# Patient Record
Sex: FEMALE | Race: WHITE | NOT HISPANIC OR LATINO | Employment: OTHER | URBAN - NONMETROPOLITAN AREA
[De-identification: names, ages, dates, MRNs, and addresses within clinical notes are randomized per-mention and may not be internally consistent; named-entity substitution may affect disease eponyms.]

---

## 2017-02-07 ENCOUNTER — DOCTOR'S OFFICE (OUTPATIENT)
Dept: URBAN - NONMETROPOLITAN AREA CLINIC 1 | Facility: CLINIC | Age: 62
Setting detail: OPHTHALMOLOGY
End: 2017-02-07
Payer: COMMERCIAL

## 2017-02-07 DIAGNOSIS — H40.003: ICD-10-CM

## 2017-02-07 DIAGNOSIS — H35.3111: ICD-10-CM

## 2017-02-07 DIAGNOSIS — H35.371: ICD-10-CM

## 2017-02-07 DIAGNOSIS — H01.004: ICD-10-CM

## 2017-02-07 DIAGNOSIS — D31.32: ICD-10-CM

## 2017-02-07 DIAGNOSIS — H35.3122: ICD-10-CM

## 2017-02-07 DIAGNOSIS — H04.121: ICD-10-CM

## 2017-02-07 DIAGNOSIS — H02.423: ICD-10-CM

## 2017-02-07 DIAGNOSIS — H01.001: ICD-10-CM

## 2017-02-07 DIAGNOSIS — H35.3131: ICD-10-CM

## 2017-02-07 DIAGNOSIS — H04.123: ICD-10-CM

## 2017-02-07 DIAGNOSIS — H25.13: ICD-10-CM

## 2017-02-07 PROBLEM — H02.011 TRICHIASIS; RIGHT UPPER LID: Status: RESOLVED | Noted: 2017-02-07 | Resolved: 2017-02-07

## 2017-02-07 PROCEDURE — 83861 MICROFLUID ANALY TEARS: CPT | Performed by: OPHTHALMOLOGY

## 2017-02-07 PROCEDURE — 92014 COMPRE OPH EXAM EST PT 1/>: CPT | Performed by: OPHTHALMOLOGY

## 2017-02-07 PROCEDURE — 76514 ECHO EXAM OF EYE THICKNESS: CPT | Performed by: OPHTHALMOLOGY

## 2017-02-07 PROCEDURE — 92133 CPTRZD OPH DX IMG PST SGM ON: CPT | Performed by: OPHTHALMOLOGY

## 2017-02-07 ASSESSMENT — REFRACTION_AUTOREFRACTION
OD_AXIS: 97
OS_AXIS: 168
OD_SPHERE: -1.00
OD_CYLINDER: -1.00
OS_SPHERE: -1.75
OS_CYLINDER: -1.00

## 2017-02-07 ASSESSMENT — LID POSITION - PTOSIS
OS_PTOSIS: 1+
OD_PTOSIS: 2+

## 2017-02-07 ASSESSMENT — SPHEQUIV_DERIVED
OD_SPHEQUIV: -1.5
OS_SPHEQUIV: -2.25
OD_SPHEQUIV: -1.25
OS_SPHEQUIV: -2.25

## 2017-02-07 ASSESSMENT — LID EXAM ASSESSMENTS
OS_MEIBOMITIS: 1+
OS_BLEPHARITIS: T
OD_MEIBOMITIS: 1+
OD_BLEPHARITIS: T

## 2017-02-07 ASSESSMENT — LID POSITION - COMMENTS
OS_COMMENTS: BILATERAL TARSORRAPHY, MRD 1= .5/2
OD_COMMENTS: BILATERAL TARSORRAPHY, MRD 1= .5/2

## 2017-02-07 ASSESSMENT — REFRACTION_MANIFEST
OS_AXIS: 162
OS_VA3: 20/
OS_VA1: 20/
OS_VA1: 20/40
OS_VA2: 20/40
OS_ADD: +2.50
OD_AXIS: 040
OU_VA: 20/
OS_SPHERE: -1.75
OS_CYLINDER: -1.00
OD_ADD: +2.50
OD_VA1: 20/
OD_VA1: 20/
OD_CYLINDER: -0.50
OS_VA1: 20/
OD_VA3: 20/
OS_VA3: 20/
OU_VA: 20/
OD_VA2: 20/
OD_VA1: 20/40
OD_VA3: 20/
OD_VA3: 20/
OS_VA2: 20/
OS_VA3: 20/
OD_VA2: 20/
OU_VA: 20/
OD_SPHERE: -1.00
OD_VA2: 20/40
OS_VA2: 20/

## 2017-02-07 ASSESSMENT — REFRACTION_CURRENTRX
OS_ADD: +2.50
OD_OVR_VA: 20/
OD_AXIS: 041
OD_AXIS: 070
OD_ADD: +2.50
OD_CYLINDER: -0.50
OS_CYLINDER: -1.00
OD_ADD: +2.25
OD_VPRISM_DIRECTION: PROGS
OS_SPHERE: -1.25
OD_SPHERE: -1.25
OD_OVR_VA: 20/
OD_SPHERE: -0.75
OS_VPRISM_DIRECTION: PROGS
OD_OVR_VA: 20/
OS_AXIS: 160
OS_SPHERE: -1.75
OS_OVR_VA: 20/
OS_OVR_VA: 20/
OS_CYLINDER: -0.50
OS_ADD: +2.25
OS_OVR_VA: 20/
OS_AXIS: 102
OD_CYLINDER: -0.50

## 2017-02-07 ASSESSMENT — PACHYMETRY
OS_CT_UM: 592
OD_CT_UM: 585
OS_CT_CORRECTION: -4
OD_CT_CORRECTION: -3

## 2017-02-07 ASSESSMENT — VISUAL ACUITY
OD_BCVA: 20/25
OS_BCVA: 20/20-1

## 2017-02-07 ASSESSMENT — SUPERFICIAL PUNCTATE KERATITIS (SPK)
OS_SPK: 1+ 2+
OD_SPK: 1+ 2+

## 2017-02-07 ASSESSMENT — CONFRONTATIONAL VISUAL FIELD TEST (CVF)
OD_FINDINGS: FULL
OS_FINDINGS: FULL

## 2017-04-17 ENCOUNTER — OPTICAL OFFICE (OUTPATIENT)
Dept: URBAN - NONMETROPOLITAN AREA CLINIC 4 | Facility: CLINIC | Age: 62
Setting detail: OPHTHALMOLOGY
End: 2017-04-17
Payer: COMMERCIAL

## 2017-04-17 ENCOUNTER — DOCTOR'S OFFICE (OUTPATIENT)
Dept: URBAN - NONMETROPOLITAN AREA CLINIC 1 | Facility: CLINIC | Age: 62
Setting detail: OPHTHALMOLOGY
End: 2017-04-17
Payer: COMMERCIAL

## 2017-04-17 DIAGNOSIS — H52.4: ICD-10-CM

## 2017-04-17 DIAGNOSIS — H52.223: ICD-10-CM

## 2017-04-17 PROCEDURE — V2025 EYEGLASSES DELUX FRAMES: HCPCS | Performed by: OPTOMETRIST

## 2017-04-17 PROCEDURE — V2750 ANTI-REFLECTIVE COATING: HCPCS | Performed by: OPTOMETRIST

## 2017-04-17 PROCEDURE — V2781 PROGRESSIVE LENS PER LENS: HCPCS | Performed by: OPTOMETRIST

## 2017-04-17 PROCEDURE — V2020 VISION SVCS FRAMES PURCHASES: HCPCS | Performed by: OPTOMETRIST

## 2017-04-17 PROCEDURE — 92015 DETERMINE REFRACTIVE STATE: CPT | Performed by: OPTOMETRIST

## 2017-04-17 PROCEDURE — V2203 LENS SPHCYL BIFOCAL 4.00D/.1: HCPCS | Performed by: OPTOMETRIST

## 2017-04-17 PROCEDURE — V2744 TINT PHOTOCHROMATIC LENS/ES: HCPCS | Performed by: OPTOMETRIST

## 2017-04-17 ASSESSMENT — REFRACTION_MANIFEST
OS_VA3: 20/
OU_VA: 20/
OD_VA1: 20/
OS_VA1: 20/
OD_VA1: 20/
OD_VA2: 20/
OD_VA2: 20/
OD_VA3: 20/
OU_VA: 20/
OS_VA3: 20/
OS_VA2: 20/
OD_VA3: 20/
OS_VA1: 20/
OS_VA2: 20/

## 2017-04-17 ASSESSMENT — REFRACTION_OUTSIDERX
OD_CYLINDER: -0.75
OS_VA2: 20/20-2
OS_SPHERE: -1.50
OD_AXIS: 030
OS_AXIS: 163
OS_VA3: 20/
OS_CYLINDER: -0.25
OS_VA1: 20/20-2
OD_VA3: 20/
OD_SPHERE: -1.00
OS_ADD: +2.50
OD_VA2: 20/20-2
OU_VA: 20/
OD_ADD: +2.50
OD_VA1: 20/20-2

## 2017-04-17 ASSESSMENT — REFRACTION_CURRENTRX
OD_CYLINDER: -0.50
OD_ADD: +2.50
OD_OVR_VA: 20/
OS_SPHERE: -1.75
OD_SPHERE: -1.00
OS_VPRISM_DIRECTION: PROGS
OS_OVR_VA: 20/
OD_AXIS: 070
OS_SPHERE: -1.25
OS_CYLINDER: -0.75
OD_AXIS: 50
OS_ADD: +2.50
OS_OVR_VA: 20/
OD_OVR_VA: 20/
OD_SPHERE: -0.75
OS_AXIS: 98
OD_CYLINDER: -0.50
OS_OVR_VA: 20/
OD_OVR_VA: 20/
OS_VPRISM_DIRECTION: PROGS
OS_CYLINDER: -0.50
OD_VPRISM_DIRECTION: PROGS
OD_VPRISM_DIRECTION: PROGS
OD_ADD: +2.50
OS_AXIS: 158
OS_ADD: +2.50

## 2017-04-17 ASSESSMENT — VISUAL ACUITY
OD_BCVA: 20/20
OS_BCVA: 20/20+1

## 2017-04-17 ASSESSMENT — REFRACTION_AUTOREFRACTION
OS_SPHERE: -1.50
OD_AXIS: 7
OS_AXIS: 163
OS_CYLINDER: -0.25
OD_SPHERE: -1.00
OD_CYLINDER: -1.00

## 2017-04-17 ASSESSMENT — SPHEQUIV_DERIVED
OS_SPHEQUIV: -1.625
OD_SPHEQUIV: -1.5

## 2017-05-10 ENCOUNTER — DOCTOR'S OFFICE (OUTPATIENT)
Dept: URBAN - NONMETROPOLITAN AREA CLINIC 1 | Facility: CLINIC | Age: 62
Setting detail: OPHTHALMOLOGY
End: 2017-05-10
Payer: COMMERCIAL

## 2017-05-10 DIAGNOSIS — H35.3111: ICD-10-CM

## 2017-05-10 DIAGNOSIS — H35.3122: ICD-10-CM

## 2017-05-10 DIAGNOSIS — H40.003: ICD-10-CM

## 2017-05-10 DIAGNOSIS — D31.32: ICD-10-CM

## 2017-05-10 DIAGNOSIS — H04.122: ICD-10-CM

## 2017-05-10 DIAGNOSIS — H04.121: ICD-10-CM

## 2017-05-10 DIAGNOSIS — H35.371: ICD-10-CM

## 2017-05-10 DIAGNOSIS — H25.13: ICD-10-CM

## 2017-05-10 PROCEDURE — 92134 CPTRZ OPH DX IMG PST SGM RTA: CPT | Performed by: OPHTHALMOLOGY

## 2017-05-10 PROCEDURE — 92014 COMPRE OPH EXAM EST PT 1/>: CPT | Performed by: OPHTHALMOLOGY

## 2017-05-10 ASSESSMENT — REFRACTION_AUTOREFRACTION
OD_CYLINDER: -1.00
OS_CYLINDER: -0.25
OD_SPHERE: -1.00
OS_SPHERE: -1.50
OD_AXIS: 7
OS_AXIS: 163

## 2017-05-10 ASSESSMENT — REFRACTION_MANIFEST
OD_VA3: 20/
OD_VA2: 20/
OS_VA3: 20/
OD_VA3: 20/
OU_VA: 20/
OS_VA1: 20/
OS_VA1: 20/
OU_VA: 20/
OD_VA1: 20/
OS_VA3: 20/
OS_VA2: 20/
OD_VA1: 20/
OD_VA2: 20/
OS_VA2: 20/

## 2017-05-10 ASSESSMENT — SPHEQUIV_DERIVED
OD_SPHEQUIV: -1.5
OS_SPHEQUIV: -1.625

## 2017-05-10 ASSESSMENT — REFRACTION_CURRENTRX
OD_VPRISM_DIRECTION: PROGS
OD_ADD: +2.50
OD_AXIS: 50
OD_VPRISM_DIRECTION: PROGS
OS_AXIS: 98
OD_ADD: +2.50
OD_AXIS: 070
OS_SPHERE: -1.25
OS_SPHERE: -1.75
OD_SPHERE: -0.75
OS_OVR_VA: 20/
OD_CYLINDER: -0.50
OS_ADD: +2.50
OS_OVR_VA: 20/
OD_SPHERE: -1.00
OS_CYLINDER: -0.50
OS_VPRISM_DIRECTION: PROGS
OD_OVR_VA: 20/
OD_CYLINDER: -0.50
OS_AXIS: 158
OS_ADD: +2.50
OS_OVR_VA: 20/
OD_OVR_VA: 20/
OS_CYLINDER: -0.75
OD_OVR_VA: 20/
OS_VPRISM_DIRECTION: PROGS

## 2017-05-10 ASSESSMENT — VISUAL ACUITY
OS_BCVA: 20/25
OD_BCVA: 20/25

## 2017-05-10 ASSESSMENT — REFRACTION_OUTSIDERX
OD_VA1: 20/20-2
OD_CYLINDER: -0.75
OD_ADD: +2.50
OS_VA1: 20/20-2
OS_ADD: +2.50
OS_SPHERE: -1.50
OS_AXIS: 163
OS_CYLINDER: -0.25
OU_VA: 20/
OS_VA2: 20/20-2
OS_VA3: 20/
OD_AXIS: 030
OD_VA3: 20/
OD_SPHERE: -1.00
OD_VA2: 20/20-2

## 2017-05-10 ASSESSMENT — SUPERFICIAL PUNCTATE KERATITIS (SPK)
OD_SPK: 1+ 2+
OS_SPK: 1+ 2+

## 2017-05-10 ASSESSMENT — LID POSITION - COMMENTS
OS_COMMENTS: BILATERAL TARSORRAPHY, MRD 1= .5/2
OD_COMMENTS: BILATERAL TARSORRAPHY, MRD 1= .5/2

## 2017-05-10 ASSESSMENT — LID EXAM ASSESSMENTS
OD_BLEPHARITIS: T
OS_BLEPHARITIS: T
OD_MEIBOMITIS: 1+
OS_MEIBOMITIS: 1+

## 2017-05-10 ASSESSMENT — CONFRONTATIONAL VISUAL FIELD TEST (CVF)
OD_FINDINGS: FULL
OS_FINDINGS: FULL

## 2017-05-10 ASSESSMENT — LID POSITION - PTOSIS
OS_PTOSIS: 1+
OD_PTOSIS: 2+

## 2017-06-12 ENCOUNTER — APPOINTMENT (OUTPATIENT)
Dept: PHYSICAL THERAPY | Facility: CLINIC | Age: 62
End: 2017-06-12
Payer: COMMERCIAL

## 2017-06-12 PROCEDURE — 97140 MANUAL THERAPY 1/> REGIONS: CPT

## 2017-06-12 PROCEDURE — 97110 THERAPEUTIC EXERCISES: CPT

## 2017-06-12 PROCEDURE — 97163 PT EVAL HIGH COMPLEX 45 MIN: CPT

## 2017-06-13 ENCOUNTER — APPOINTMENT (OUTPATIENT)
Dept: PHYSICAL THERAPY | Facility: CLINIC | Age: 62
End: 2017-06-13
Payer: COMMERCIAL

## 2017-06-13 PROCEDURE — 97110 THERAPEUTIC EXERCISES: CPT

## 2017-06-13 PROCEDURE — 97140 MANUAL THERAPY 1/> REGIONS: CPT

## 2017-06-15 ENCOUNTER — APPOINTMENT (OUTPATIENT)
Dept: PHYSICAL THERAPY | Facility: CLINIC | Age: 62
End: 2017-06-15
Payer: COMMERCIAL

## 2017-06-15 PROCEDURE — 97140 MANUAL THERAPY 1/> REGIONS: CPT

## 2017-06-15 PROCEDURE — 97110 THERAPEUTIC EXERCISES: CPT

## 2017-06-19 ENCOUNTER — APPOINTMENT (OUTPATIENT)
Dept: PHYSICAL THERAPY | Facility: CLINIC | Age: 62
End: 2017-06-19
Payer: COMMERCIAL

## 2017-06-19 PROCEDURE — 97110 THERAPEUTIC EXERCISES: CPT

## 2017-06-19 PROCEDURE — 97140 MANUAL THERAPY 1/> REGIONS: CPT

## 2017-06-20 ENCOUNTER — APPOINTMENT (OUTPATIENT)
Dept: PHYSICAL THERAPY | Facility: CLINIC | Age: 62
End: 2017-06-20
Payer: COMMERCIAL

## 2017-06-20 PROCEDURE — 97140 MANUAL THERAPY 1/> REGIONS: CPT

## 2017-06-20 PROCEDURE — 97110 THERAPEUTIC EXERCISES: CPT

## 2017-06-22 ENCOUNTER — APPOINTMENT (OUTPATIENT)
Dept: PHYSICAL THERAPY | Facility: CLINIC | Age: 62
End: 2017-06-22
Payer: COMMERCIAL

## 2017-06-22 PROCEDURE — 97110 THERAPEUTIC EXERCISES: CPT

## 2017-06-22 PROCEDURE — 97140 MANUAL THERAPY 1/> REGIONS: CPT

## 2017-06-26 ENCOUNTER — APPOINTMENT (OUTPATIENT)
Dept: PHYSICAL THERAPY | Facility: CLINIC | Age: 62
End: 2017-06-26
Payer: COMMERCIAL

## 2017-06-26 PROCEDURE — 97140 MANUAL THERAPY 1/> REGIONS: CPT

## 2017-06-26 PROCEDURE — 97110 THERAPEUTIC EXERCISES: CPT

## 2017-06-27 ENCOUNTER — APPOINTMENT (OUTPATIENT)
Dept: PHYSICAL THERAPY | Facility: CLINIC | Age: 62
End: 2017-06-27
Payer: COMMERCIAL

## 2017-06-27 PROCEDURE — 97140 MANUAL THERAPY 1/> REGIONS: CPT

## 2017-06-27 PROCEDURE — 97110 THERAPEUTIC EXERCISES: CPT

## 2017-06-29 ENCOUNTER — APPOINTMENT (OUTPATIENT)
Dept: PHYSICAL THERAPY | Facility: CLINIC | Age: 62
End: 2017-06-29
Payer: COMMERCIAL

## 2017-06-29 PROCEDURE — 97140 MANUAL THERAPY 1/> REGIONS: CPT

## 2017-06-29 PROCEDURE — 97110 THERAPEUTIC EXERCISES: CPT

## 2017-07-03 ENCOUNTER — APPOINTMENT (OUTPATIENT)
Dept: PHYSICAL THERAPY | Facility: CLINIC | Age: 62
End: 2017-07-03
Payer: COMMERCIAL

## 2017-07-03 PROCEDURE — 97140 MANUAL THERAPY 1/> REGIONS: CPT

## 2017-07-03 PROCEDURE — 97110 THERAPEUTIC EXERCISES: CPT

## 2018-02-13 ENCOUNTER — DOCTOR'S OFFICE (OUTPATIENT)
Dept: URBAN - NONMETROPOLITAN AREA CLINIC 1 | Facility: CLINIC | Age: 63
Setting detail: OPHTHALMOLOGY
End: 2018-02-13
Payer: COMMERCIAL

## 2018-02-13 ENCOUNTER — RX ONLY (RX ONLY)
Age: 63
End: 2018-02-13

## 2018-02-13 DIAGNOSIS — H01.004: ICD-10-CM

## 2018-02-13 DIAGNOSIS — H01.001: ICD-10-CM

## 2018-02-13 DIAGNOSIS — H04.123: ICD-10-CM

## 2018-02-13 DIAGNOSIS — H17.9: ICD-10-CM

## 2018-02-13 DIAGNOSIS — H10.12: ICD-10-CM

## 2018-02-13 PROCEDURE — 92014 COMPRE OPH EXAM EST PT 1/>: CPT | Performed by: OPHTHALMOLOGY

## 2018-02-13 ASSESSMENT — LID POSITION - PTOSIS
OD_PTOSIS: 2+
OS_PTOSIS: 1+

## 2018-02-13 ASSESSMENT — REFRACTION_OUTSIDERX
OU_VA: 20/
OS_VA2: 20/20-2
OS_ADD: +2.50
OD_VA1: 20/20-2
OS_SPHERE: -1.50
OD_SPHERE: -1.00
OD_CYLINDER: -0.75
OD_VA3: 20/
OS_AXIS: 163
OS_VA3: 20/
OS_VA1: 20/20-2
OD_ADD: +2.50
OS_CYLINDER: -0.25
OD_VA2: 20/20-2
OD_AXIS: 030

## 2018-02-13 ASSESSMENT — REFRACTION_CURRENTRX
OD_AXIS: 50
OS_SPHERE: -1.25
OD_CYLINDER: -0.50
OS_CYLINDER: -0.75
OD_ADD: +2.50
OS_VPRISM_DIRECTION: PROGS
OD_VPRISM_DIRECTION: PROGS
OS_CYLINDER: -0.50
OS_VPRISM_DIRECTION: PROGS
OD_OVR_VA: 20/
OS_ADD: +2.50
OD_SPHERE: -0.75
OS_AXIS: 158
OD_VPRISM_DIRECTION: PROGS
OD_OVR_VA: 20/
OD_SPHERE: -1.00
OS_OVR_VA: 20/
OD_AXIS: 070
OS_SPHERE: -1.75
OS_OVR_VA: 20/
OD_OVR_VA: 20/
OS_AXIS: 98
OD_CYLINDER: -0.50
OS_ADD: +2.50
OD_ADD: +2.50
OS_OVR_VA: 20/

## 2018-02-13 ASSESSMENT — VISUAL ACUITY
OD_BCVA: 20/30+2
OS_BCVA: 20/30+1

## 2018-02-13 ASSESSMENT — REFRACTION_AUTOREFRACTION
OS_CYLINDER: -0.25
OD_AXIS: 7
OD_SPHERE: -1.00
OS_SPHERE: -1.50
OS_AXIS: 163
OD_CYLINDER: -1.00

## 2018-02-13 ASSESSMENT — REFRACTION_MANIFEST
OD_VA2: 20/
OS_VA3: 20/
OS_VA3: 20/
OD_VA1: 20/
OS_VA2: 20/
OS_VA1: 20/
OS_VA2: 20/
OS_VA1: 20/
OD_VA1: 20/
OD_VA3: 20/
OD_VA2: 20/
OU_VA: 20/
OU_VA: 20/
OD_VA3: 20/

## 2018-02-13 ASSESSMENT — LID EXAM ASSESSMENTS
OS_MEIBOMITIS: 1+
OD_MEIBOMITIS: 1+
OS_BLEPHARITIS: T
OD_BLEPHARITIS: T

## 2018-02-13 ASSESSMENT — SPHEQUIV_DERIVED
OS_SPHEQUIV: -1.625
OD_SPHEQUIV: -1.5

## 2018-02-13 ASSESSMENT — CONFRONTATIONAL VISUAL FIELD TEST (CVF)
OD_FINDINGS: FULL
OS_FINDINGS: FULL

## 2018-02-13 ASSESSMENT — SUPERFICIAL PUNCTATE KERATITIS (SPK)
OD_SPK: 1+ 2+
OS_SPK: 1+ 2+

## 2018-02-13 ASSESSMENT — LID POSITION - COMMENTS
OS_COMMENTS: BILATERAL TARSORRAPHY, MRD 1= .5/2
OD_COMMENTS: BILATERAL TARSORRAPHY, MRD 1= .5/2

## 2018-07-31 ENCOUNTER — DOCTOR'S OFFICE (OUTPATIENT)
Dept: URBAN - NONMETROPOLITAN AREA CLINIC 1 | Facility: CLINIC | Age: 63
Setting detail: OPHTHALMOLOGY
End: 2018-07-31
Payer: COMMERCIAL

## 2018-07-31 DIAGNOSIS — H04.122: ICD-10-CM

## 2018-07-31 DIAGNOSIS — H35.3111: ICD-10-CM

## 2018-07-31 DIAGNOSIS — H35.371: ICD-10-CM

## 2018-07-31 DIAGNOSIS — H04.123: ICD-10-CM

## 2018-07-31 DIAGNOSIS — H01.004: ICD-10-CM

## 2018-07-31 DIAGNOSIS — H25.13: ICD-10-CM

## 2018-07-31 DIAGNOSIS — H10.12: ICD-10-CM

## 2018-07-31 DIAGNOSIS — H01.001: ICD-10-CM

## 2018-07-31 DIAGNOSIS — H35.3122: ICD-10-CM

## 2018-07-31 DIAGNOSIS — H17.9: ICD-10-CM

## 2018-07-31 DIAGNOSIS — H40.013: ICD-10-CM

## 2018-07-31 PROCEDURE — 76514 ECHO EXAM OF EYE THICKNESS: CPT | Performed by: OPHTHALMOLOGY

## 2018-07-31 PROCEDURE — 92134 CPTRZ OPH DX IMG PST SGM RTA: CPT | Performed by: OPHTHALMOLOGY

## 2018-07-31 PROCEDURE — 92014 COMPRE OPH EXAM EST PT 1/>: CPT | Performed by: OPHTHALMOLOGY

## 2018-07-31 PROCEDURE — 83861 MICROFLUID ANALY TEARS: CPT | Performed by: OPHTHALMOLOGY

## 2018-07-31 PROCEDURE — 92083 EXTENDED VISUAL FIELD XM: CPT | Performed by: OPHTHALMOLOGY

## 2018-07-31 ASSESSMENT — REFRACTION_CURRENTRX
OD_ADD: +2.50
OS_AXIS: 98
OD_AXIS: 50
OD_SPHERE: -0.75
OD_ADD: +2.50
OS_ADD: +2.50
OS_AXIS: 158
OD_OVR_VA: 20/
OD_OVR_VA: 20/
OS_CYLINDER: -0.75
OD_OVR_VA: 20/
OD_SPHERE: -1.00
OS_SPHERE: -1.75
OS_ADD: +2.50
OS_OVR_VA: 20/
OS_VPRISM_DIRECTION: PROGS
OD_VPRISM_DIRECTION: PROGS
OD_CYLINDER: -0.50
OS_VPRISM_DIRECTION: PROGS
OD_AXIS: 070
OS_SPHERE: -1.25
OS_OVR_VA: 20/
OS_OVR_VA: 20/
OD_VPRISM_DIRECTION: PROGS
OD_CYLINDER: -0.50
OS_CYLINDER: -0.50

## 2018-07-31 ASSESSMENT — SPHEQUIV_DERIVED
OS_SPHEQUIV: -1.625
OD_SPHEQUIV: -1.5

## 2018-07-31 ASSESSMENT — REFRACTION_OUTSIDERX
OD_SPHERE: -1.00
OD_ADD: +2.50
OS_VA2: 20/20-2
OS_CYLINDER: -0.25
OS_ADD: +2.50
OU_VA: 20/
OD_VA3: 20/
OS_VA1: 20/20-2
OD_VA1: 20/20-2
OD_CYLINDER: -0.75
OD_AXIS: 030
OS_VA3: 20/
OS_SPHERE: -1.50
OD_VA2: 20/20-2
OS_AXIS: 163

## 2018-07-31 ASSESSMENT — REFRACTION_MANIFEST
OS_VA1: 20/
OD_VA2: 20/
OD_VA1: 20/
OS_VA1: 20/
OU_VA: 20/
OS_VA2: 20/
OD_VA3: 20/
OS_VA3: 20/
OD_VA3: 20/
OU_VA: 20/
OD_VA2: 20/
OS_VA3: 20/
OS_VA2: 20/
OD_VA1: 20/

## 2018-07-31 ASSESSMENT — PACHYMETRY
OD_CT_UM: 621
OS_CT_UM: 631
OD_CT_CORRECTION: -6
OS_CT_CORRECTION: -6

## 2018-07-31 ASSESSMENT — LID EXAM ASSESSMENTS
OS_BLEPHARITIS: T
OD_MEIBOMITIS: 1+
OS_MEIBOMITIS: 1+
OD_BLEPHARITIS: T

## 2018-07-31 ASSESSMENT — LID POSITION - COMMENTS
OS_COMMENTS: BILATERAL TARSORRAPHY, MRD 1= .5/2
OD_COMMENTS: BILATERAL TARSORRAPHY, MRD 1= .5/2

## 2018-07-31 ASSESSMENT — CONFRONTATIONAL VISUAL FIELD TEST (CVF)
OD_FINDINGS: FULL
OS_FINDINGS: FULL

## 2018-07-31 ASSESSMENT — REFRACTION_AUTOREFRACTION
OS_SPHERE: -1.50
OD_AXIS: 7
OD_SPHERE: -1.00
OS_AXIS: 163
OS_CYLINDER: -0.25
OD_CYLINDER: -1.00

## 2018-07-31 ASSESSMENT — VISUAL ACUITY
OS_BCVA: 20/25-2
OD_BCVA: 20/25-1

## 2018-07-31 ASSESSMENT — SUPERFICIAL PUNCTATE KERATITIS (SPK)
OS_SPK: 1+ 2+
OD_SPK: 1+ 2+

## 2018-07-31 ASSESSMENT — LID POSITION - PTOSIS
OS_PTOSIS: 1+
OD_PTOSIS: 2+

## 2019-02-08 ENCOUNTER — DOCTOR'S OFFICE (OUTPATIENT)
Dept: URBAN - NONMETROPOLITAN AREA CLINIC 1 | Facility: CLINIC | Age: 64
Setting detail: OPHTHALMOLOGY
End: 2019-02-08
Payer: COMMERCIAL

## 2019-02-08 DIAGNOSIS — H40.013: ICD-10-CM

## 2019-02-08 DIAGNOSIS — H25.13: ICD-10-CM

## 2019-02-08 DIAGNOSIS — H04.122: ICD-10-CM

## 2019-02-08 DIAGNOSIS — H17.9: ICD-10-CM

## 2019-02-08 DIAGNOSIS — H04.121: ICD-10-CM

## 2019-02-08 DIAGNOSIS — H04.123: ICD-10-CM

## 2019-02-08 PROBLEM — H10.12 ALLERGIC CONJUNCTIVITIS; LEFT EYE: Status: RESOLVED | Noted: 2018-02-13 | Resolved: 2019-02-08

## 2019-02-08 PROCEDURE — 83861 MICROFLUID ANALY TEARS: CPT | Performed by: OPHTHALMOLOGY

## 2019-02-08 PROCEDURE — 92014 COMPRE OPH EXAM EST PT 1/>: CPT | Performed by: OPHTHALMOLOGY

## 2019-02-08 PROCEDURE — 92133 CPTRZD OPH DX IMG PST SGM ON: CPT | Performed by: OPHTHALMOLOGY

## 2019-02-08 ASSESSMENT — SPHEQUIV_DERIVED
OD_SPHEQUIV: -1.375
OS_SPHEQUIV: -1.625
OS_SPHEQUIV: -1.625
OD_SPHEQUIV: -1.5

## 2019-02-08 ASSESSMENT — REFRACTION_CURRENTRX
OD_OVR_VA: 20/
OS_ADD: +2.50
OS_VPRISM_DIRECTION: PROGS
OS_AXIS: 158
OS_CYLINDER: -0.75
OS_OVR_VA: 20/
OS_SPHERE: -1.25
OD_AXIS: 070
OD_CYLINDER: -0.50
OS_OVR_VA: 20/
OD_OVR_VA: 20/
OS_SPHERE: -1.75
OD_ADD: +2.50
OD_OVR_VA: 20/
OS_CYLINDER: -0.50
OS_ADD: +2.50
OS_VPRISM_DIRECTION: PROGS
OD_AXIS: 50
OD_VPRISM_DIRECTION: PROGS
OD_VPRISM_DIRECTION: PROGS
OD_SPHERE: -0.75
OD_SPHERE: -1.00
OS_OVR_VA: 20/
OD_ADD: +2.50
OD_CYLINDER: -0.50
OS_AXIS: 98

## 2019-02-08 ASSESSMENT — REFRACTION_MANIFEST
OS_AXIS: 163
OU_VA: 20/
OD_VA1: 20/20-2
OS_VA3: 20/
OS_VA1: 20/
OD_AXIS: 030
OS_VA2: 20/20-2
OD_VA1: 20/
OS_ADD: +2.50
OD_VA3: 20/
OD_VA2: 20/20-2
OD_SPHERE: -1.00
OD_VA2: 20/
OS_VA2: 20/
OS_CYLINDER: -0.25
OS_SPHERE: -1.50
OD_VA3: 20/
OS_VA1: 20/20-2
OD_ADD: +2.50
OS_VA3: 20/
OU_VA: 20/
OD_CYLINDER: -0.75

## 2019-02-08 ASSESSMENT — VISUAL ACUITY
OS_BCVA: 20/25-1
OD_BCVA: 20/25-1

## 2019-02-08 ASSESSMENT — SUPERFICIAL PUNCTATE KERATITIS (SPK)
OS_SPK: 1+ 2+
OD_SPK: 1+ 2+

## 2019-02-08 ASSESSMENT — LID POSITION - COMMENTS
OD_COMMENTS: BILATERAL TARSORRAPHY, MRD 1= .5/2
OS_COMMENTS: BILATERAL TARSORRAPHY, MRD 1= .5/2

## 2019-02-08 ASSESSMENT — LID EXAM ASSESSMENTS
OS_BLEPHARITIS: T
OD_MEIBOMITIS: 1+
OS_MEIBOMITIS: 1+
OD_BLEPHARITIS: T

## 2019-02-08 ASSESSMENT — CONFRONTATIONAL VISUAL FIELD TEST (CVF)
OS_FINDINGS: FULL
OD_FINDINGS: FULL

## 2019-02-08 ASSESSMENT — LID POSITION - PTOSIS
OS_PTOSIS: 1+
OD_PTOSIS: 2+

## 2019-02-08 ASSESSMENT — REFRACTION_AUTOREFRACTION
OS_AXIS: 163
OD_AXIS: 7
OS_SPHERE: -1.50
OD_SPHERE: -1.00
OD_CYLINDER: -1.00
OS_CYLINDER: -0.25

## 2019-08-13 ENCOUNTER — DOCTOR'S OFFICE (OUTPATIENT)
Dept: URBAN - NONMETROPOLITAN AREA CLINIC 1 | Facility: CLINIC | Age: 64
Setting detail: OPHTHALMOLOGY
End: 2019-08-13
Payer: COMMERCIAL

## 2019-08-13 DIAGNOSIS — H35.371: ICD-10-CM

## 2019-08-13 DIAGNOSIS — H40.013: ICD-10-CM

## 2019-08-13 DIAGNOSIS — H04.123: ICD-10-CM

## 2019-08-13 DIAGNOSIS — H04.122: ICD-10-CM

## 2019-08-13 DIAGNOSIS — H25.13: ICD-10-CM

## 2019-08-13 DIAGNOSIS — H04.121: ICD-10-CM

## 2019-08-13 PROCEDURE — 99215 OFFICE O/P EST HI 40 MIN: CPT | Performed by: OPHTHALMOLOGY

## 2019-08-13 PROCEDURE — 83861 MICROFLUID ANALY TEARS: CPT | Performed by: OPHTHALMOLOGY

## 2019-08-13 PROCEDURE — 68761 CLOSE TEAR DUCT OPENING: CPT | Performed by: OPHTHALMOLOGY

## 2019-08-13 PROCEDURE — 92083 EXTENDED VISUAL FIELD XM: CPT | Performed by: OPHTHALMOLOGY

## 2019-08-13 PROCEDURE — 92134 CPTRZ OPH DX IMG PST SGM RTA: CPT | Performed by: OPHTHALMOLOGY

## 2019-08-13 ASSESSMENT — REFRACTION_CURRENTRX
OS_CYLINDER: -0.50
OS_AXIS: 102
OS_ADD: +2.50
OS_AXIS: 158
OD_OVR_VA: 20/
OS_VPRISM_DIRECTION: PROGS
OD_CYLINDER: -0.50
OD_VPRISM_DIRECTION: PROGS
OD_CYLINDER: -0.50
OD_AXIS: 069
OS_OVR_VA: 20/
OD_VPRISM_DIRECTION: PROGS
OS_VPRISM_DIRECTION: PROGS
OD_OVR_VA: 20/
OD_OVR_VA: 20/
OD_SPHERE: -1.00
OS_OVR_VA: 20/
OD_SPHERE: -0.75
OD_ADD: +2.50
OD_AXIS: 50
OS_CYLINDER: -0.75
OD_ADD: +2.50
OS_ADD: +2.50
OS_SPHERE: -1.25
OS_OVR_VA: 20/
OS_SPHERE: -1.75

## 2019-08-13 ASSESSMENT — REFRACTION_MANIFEST
OS_VA2: 20/20-2
OD_ADD: +2.50
OD_SPHERE: -1.00
OD_VA3: 20/
OD_VA1: 20/20-2
OS_VA1: 20/
OS_AXIS: 163
OU_VA: 20/
OS_VA3: 20/
OD_VA1: 20/
OS_VA2: 20/
OS_CYLINDER: -0.25
OD_VA3: 20/
OD_VA2: 20/20-2
OD_VA2: 20/
OU_VA: 20/
OS_SPHERE: -1.50
OS_ADD: +2.50
OD_AXIS: 030
OS_VA1: 20/20-2
OD_CYLINDER: -0.75
OS_VA3: 20/

## 2019-08-13 ASSESSMENT — VISUAL ACUITY
OD_BCVA: 20/25-1
OS_BCVA: 20/25

## 2019-08-13 ASSESSMENT — LID EXAM ASSESSMENTS
OD_BLEPHARITIS: T
OD_MEIBOMITIS: 1+
OS_MEIBOMITIS: 1+
OS_BLEPHARITIS: T

## 2019-08-13 ASSESSMENT — CONFRONTATIONAL VISUAL FIELD TEST (CVF)
OS_FINDINGS: FULL
OD_FINDINGS: FULL

## 2019-08-13 ASSESSMENT — LID POSITION - PTOSIS
OS_PTOSIS: 1+
OD_PTOSIS: 2+

## 2019-08-13 ASSESSMENT — LID POSITION - COMMENTS
OS_COMMENTS: BILATERAL TARSORRAPHY, MRD 1= .5/2
OD_COMMENTS: BILATERAL TARSORRAPHY, MRD 1= .5/2

## 2019-08-13 ASSESSMENT — REFRACTION_AUTOREFRACTION
OD_CYLINDER: -1.00
OS_SPHERE: -0.75
OD_SPHERE: -0.50
OD_AXIS: 008
OS_CYLINDER: 0.00

## 2019-08-13 ASSESSMENT — SPHEQUIV_DERIVED
OS_SPHEQUIV: -1.625
OD_SPHEQUIV: -1
OD_SPHEQUIV: -1.375
OS_SPHEQUIV: -0.75

## 2019-08-13 ASSESSMENT — SUPERFICIAL PUNCTATE KERATITIS (SPK)
OD_SPK: 1+ 2+
OS_SPK: 1+ 2+

## 2019-08-30 ENCOUNTER — DOCTOR'S OFFICE (OUTPATIENT)
Dept: URBAN - METROPOLITAN AREA CLINIC 136 | Facility: CLINIC | Age: 64
Setting detail: OPHTHALMOLOGY
End: 2019-08-30
Payer: COMMERCIAL

## 2019-08-30 DIAGNOSIS — H35.3111: ICD-10-CM

## 2019-08-30 DIAGNOSIS — H35.371: ICD-10-CM

## 2019-08-30 DIAGNOSIS — H35.3122: ICD-10-CM

## 2019-08-30 DIAGNOSIS — H04.123: ICD-10-CM

## 2019-08-30 PROCEDURE — 92014 COMPRE OPH EXAM EST PT 1/>: CPT | Performed by: OPHTHALMOLOGY

## 2019-08-30 PROCEDURE — 92134 CPTRZ OPH DX IMG PST SGM RTA: CPT | Performed by: OPHTHALMOLOGY

## 2019-08-30 ASSESSMENT — REFRACTION_MANIFEST
OD_VA3: 20/
OS_VA1: 20/20-2
OD_ADD: +2.50
OS_VA3: 20/
OS_CYLINDER: -0.25
OD_VA3: 20/
OD_SPHERE: -1.00
OD_VA2: 20/
OD_VA1: 20/
OU_VA: 20/
OS_AXIS: 163
OU_VA: 20/
OS_VA1: 20/
OS_VA3: 20/
OS_SPHERE: -1.50
OD_VA1: 20/20-2
OD_CYLINDER: -0.75
OD_AXIS: 030
OS_ADD: +2.50
OS_VA2: 20/
OD_VA2: 20/20-2
OS_VA2: 20/20-2

## 2019-08-30 ASSESSMENT — LID POSITION - COMMENTS
OD_COMMENTS: BILATERAL TARSORRAPHY, MRD 1= .5/2
OS_COMMENTS: BILATERAL TARSORRAPHY, MRD 1= .5/2

## 2019-08-30 ASSESSMENT — LID EXAM ASSESSMENTS
OS_BLEPHARITIS: T
OS_MEIBOMITIS: 1+
OD_MEIBOMITIS: 1+
OD_BLEPHARITIS: T

## 2019-08-30 ASSESSMENT — REFRACTION_CURRENTRX
OS_ADD: +2.50
OS_AXIS: 158
OD_AXIS: 50
OD_ADD: +2.50
OS_OVR_VA: 20/
OD_ADD: +2.50
OD_SPHERE: -1.00
OD_VPRISM_DIRECTION: PROGS
OS_CYLINDER: -0.50
OD_SPHERE: -0.75
OS_AXIS: 102
OS_OVR_VA: 20/
OD_CYLINDER: -0.50
OS_VPRISM_DIRECTION: PROGS
OS_SPHERE: -1.25
OS_ADD: +2.50
OD_OVR_VA: 20/
OS_SPHERE: -1.75
OD_OVR_VA: 20/
OD_VPRISM_DIRECTION: PROGS
OS_VPRISM_DIRECTION: PROGS
OD_OVR_VA: 20/
OS_OVR_VA: 20/
OD_AXIS: 069
OD_CYLINDER: -0.50
OS_CYLINDER: -0.75

## 2019-08-30 ASSESSMENT — REFRACTION_AUTOREFRACTION
OS_CYLINDER: 0.00
OD_AXIS: 008
OD_SPHERE: -0.50
OD_CYLINDER: -1.00
OS_SPHERE: -0.75

## 2019-08-30 ASSESSMENT — VISUAL ACUITY
OS_BCVA: 20/25-1
OD_BCVA: 20/25-1

## 2019-08-30 ASSESSMENT — SPHEQUIV_DERIVED
OS_SPHEQUIV: -0.75
OD_SPHEQUIV: -1
OD_SPHEQUIV: -1.375
OS_SPHEQUIV: -1.625

## 2019-08-30 ASSESSMENT — SUPERFICIAL PUNCTATE KERATITIS (SPK)
OS_SPK: 1+ 2+
OD_SPK: 1+ 2+

## 2019-08-30 ASSESSMENT — LID POSITION - PTOSIS
OS_PTOSIS: 1+
OD_PTOSIS: 2+

## 2019-08-30 ASSESSMENT — CONFRONTATIONAL VISUAL FIELD TEST (CVF)
OD_FINDINGS: FULL
OS_FINDINGS: FULL

## 2019-09-04 ENCOUNTER — DOCTOR'S OFFICE (OUTPATIENT)
Dept: URBAN - NONMETROPOLITAN AREA CLINIC 1 | Facility: CLINIC | Age: 64
Setting detail: OPHTHALMOLOGY
End: 2019-09-04
Payer: COMMERCIAL

## 2019-09-04 DIAGNOSIS — H04.123: ICD-10-CM

## 2019-09-04 DIAGNOSIS — H02.433: ICD-10-CM

## 2019-09-04 PROCEDURE — 92012 INTRM OPH EXAM EST PATIENT: CPT | Performed by: PHYSICIAN ASSISTANT

## 2019-09-04 ASSESSMENT — LID EXAM ASSESSMENTS
OS_BLEPHARITIS: T
OD_BLEPHARITIS: T
OS_MEIBOMITIS: 1+

## 2019-09-04 ASSESSMENT — DECREASING TEAR LAKE - SEVERITY SCORE: OD_DEC_TEARLAKE: T

## 2019-09-04 ASSESSMENT — SUPERFICIAL PUNCTATE KERATITIS (SPK)
OD_SPK: T
OS_SPK: 1+

## 2019-09-04 ASSESSMENT — LID POSITION - COMMENTS
OS_COMMENTS: BILATERAL TARSORRAPHY, MRD 1= .5/2
OD_COMMENTS: BILATERAL TARSORRAPHY, MRD 1= .5/2

## 2019-09-04 ASSESSMENT — LID POSITION - PTOSIS
OS_PTOSIS: 1+
OD_PTOSIS: 2+

## 2019-09-04 ASSESSMENT — CONFRONTATIONAL VISUAL FIELD TEST (CVF)
OD_FINDINGS: FULL
OS_FINDINGS: FULL

## 2019-09-05 ASSESSMENT — REFRACTION_MANIFEST
OS_VA1: 20/20-2
OS_VA3: 20/
OS_CYLINDER: -0.25
OD_SPHERE: -1.00
OU_VA: 20/
OS_AXIS: 163
OD_VA1: 20/
OD_AXIS: 030
OS_SPHERE: -1.50
OS_ADD: +2.50
OD_VA3: 20/
OS_VA2: 20/
OD_ADD: +2.50
OS_VA2: 20/20-2
OD_VA2: 20/
OD_VA3: 20/
OU_VA: 20/
OD_VA2: 20/20-2
OD_VA1: 20/20-2
OS_VA1: 20/
OS_VA3: 20/
OD_CYLINDER: -0.75

## 2019-09-05 ASSESSMENT — SPHEQUIV_DERIVED
OD_SPHEQUIV: -1.375
OS_SPHEQUIV: -0.75
OS_SPHEQUIV: -1.625
OD_SPHEQUIV: -1

## 2019-09-05 ASSESSMENT — REFRACTION_CURRENTRX
OS_ADD: +2.50
OS_VPRISM_DIRECTION: PROGS
OD_SPHERE: -0.75
OD_ADD: +2.50
OS_AXIS: 102
OS_AXIS: 158
OD_ADD: +2.50
OD_VPRISM_DIRECTION: PROGS
OD_OVR_VA: 20/
OD_CYLINDER: -0.50
OS_VPRISM_DIRECTION: PROGS
OS_SPHERE: -1.75
OS_OVR_VA: 20/
OD_OVR_VA: 20/
OS_CYLINDER: -0.75
OD_OVR_VA: 20/
OS_CYLINDER: -0.50
OS_OVR_VA: 20/
OS_OVR_VA: 20/
OD_AXIS: 069
OD_SPHERE: -1.00
OD_VPRISM_DIRECTION: PROGS
OS_ADD: +2.50
OS_SPHERE: -1.25
OD_AXIS: 50
OD_CYLINDER: -0.50

## 2019-09-05 ASSESSMENT — REFRACTION_AUTOREFRACTION
OS_CYLINDER: 0.00
OD_CYLINDER: -1.00
OD_AXIS: 008
OS_SPHERE: -0.75
OD_SPHERE: -0.50

## 2019-09-05 ASSESSMENT — VISUAL ACUITY
OD_BCVA: 20/25
OS_BCVA: 20/25

## 2019-09-17 ENCOUNTER — DOCTOR'S OFFICE (OUTPATIENT)
Dept: URBAN - NONMETROPOLITAN AREA CLINIC 1 | Facility: CLINIC | Age: 64
Setting detail: OPHTHALMOLOGY
End: 2019-09-17
Payer: COMMERCIAL

## 2019-09-17 ENCOUNTER — RX ONLY (RX ONLY)
Age: 64
End: 2019-09-17

## 2019-09-17 DIAGNOSIS — H02.403: ICD-10-CM

## 2019-09-17 DIAGNOSIS — H25.013: ICD-10-CM

## 2019-09-17 DIAGNOSIS — H25.13: ICD-10-CM

## 2019-09-17 DIAGNOSIS — H04.123: ICD-10-CM

## 2019-09-17 PROCEDURE — 92014 COMPRE OPH EXAM EST PT 1/>: CPT | Performed by: OPHTHALMOLOGY

## 2019-09-17 ASSESSMENT — REFRACTION_CURRENTRX
OD_SPHERE: -0.75
OS_OVR_VA: 20/
OS_SPHERE: -1.25
OS_VPRISM_DIRECTION: PROGS
OD_CYLINDER: -0.50
OD_AXIS: 50
OD_ADD: +2.50
OS_AXIS: 158
OS_AXIS: 101
OS_OVR_VA: 20/
OS_OVR_VA: 20/
OD_ADD: +2.50
OS_ADD: +2.50
OS_ADD: +2.50
OS_SPHERE: -1.75
OS_CYLINDER: -0.50
OD_OVR_VA: 20/
OD_VPRISM_DIRECTION: PROGS
OD_AXIS: 067
OS_CYLINDER: -0.75
OD_SPHERE: -1.00
OS_VPRISM_DIRECTION: PROGS
OD_VPRISM_DIRECTION: PROGS
OD_CYLINDER: -0.50

## 2019-09-17 ASSESSMENT — REFRACTION_MANIFEST
OD_VA2: 20/
OD_SPHERE: -1.00
OD_VA3: 20/
OD_VA2: 20/20-2
OS_VA2: 20/
OD_CYLINDER: -0.75
OU_VA: 20/
OS_ADD: +2.50
OS_VA1: 20/
OU_VA: 20/
OD_AXIS: 030
OS_VA3: 20/
OS_VA1: 20/20-2
OD_VA1: 20/20-2
OS_AXIS: 163
OS_CYLINDER: -0.25
OD_ADD: +2.50
OD_VA3: 20/
OS_VA2: 20/20-2
OS_VA3: 20/
OD_VA1: 20/
OS_SPHERE: -1.50

## 2019-09-17 ASSESSMENT — CONFRONTATIONAL VISUAL FIELD TEST (CVF)
OS_FINDINGS: FULL
OD_FINDINGS: FULL

## 2019-09-17 ASSESSMENT — REFRACTION_AUTOREFRACTION
OD_AXIS: 015
OS_SPHERE: -0.50
OD_SPHERE: 0.00
OD_CYLINDER: -0.75
OS_AXIS: 003
OS_CYLINDER: -0.50

## 2019-09-17 ASSESSMENT — DECREASING TEAR LAKE - SEVERITY SCORE: OD_DEC_TEARLAKE: T

## 2019-09-17 ASSESSMENT — LID EXAM ASSESSMENTS
OS_BLEPHARITIS: T
OS_MEIBOMITIS: 1+
OD_BLEPHARITIS: T

## 2019-09-17 ASSESSMENT — LID POSITION - PTOSIS
OD_PTOSIS: 2+
OS_PTOSIS: 1+

## 2019-09-17 ASSESSMENT — SUPERFICIAL PUNCTATE KERATITIS (SPK)
OD_SPK: T
OS_SPK: 1+

## 2019-09-17 ASSESSMENT — SPHEQUIV_DERIVED
OS_SPHEQUIV: -1.625
OD_SPHEQUIV: -1.375
OD_SPHEQUIV: -0.375
OS_SPHEQUIV: -0.75

## 2019-09-17 ASSESSMENT — LID POSITION - COMMENTS
OD_COMMENTS: BILATERAL TARSORRAPHY, MRD 1= .5/2
OS_COMMENTS: BILATERAL TARSORRAPHY, MRD 1= .5/2

## 2019-09-17 ASSESSMENT — VISUAL ACUITY
OD_BCVA: 20/25
OS_BCVA: 20/25+1

## 2019-10-22 ENCOUNTER — DOCTOR'S OFFICE (OUTPATIENT)
Dept: URBAN - NONMETROPOLITAN AREA CLINIC 1 | Facility: CLINIC | Age: 64
Setting detail: OPHTHALMOLOGY
End: 2019-10-22
Payer: COMMERCIAL

## 2019-10-22 DIAGNOSIS — H25.11: ICD-10-CM

## 2019-10-22 PROCEDURE — 92136 OPHTHALMIC BIOMETRY: CPT | Performed by: OPHTHALMOLOGY

## 2019-10-23 ENCOUNTER — AMBUL SURGICAL CARE (OUTPATIENT)
Dept: URBAN - METROPOLITAN AREA SURGERY 32 | Facility: SURGERY | Age: 64
Setting detail: OPHTHALMOLOGY
End: 2019-10-23
Payer: COMMERCIAL

## 2019-10-23 DIAGNOSIS — H25.12: ICD-10-CM

## 2019-10-23 PROCEDURE — LENSX LASER ASSISTED CATARACT EXTRACTION W/POSSIBLE IOL IMPLANT: Performed by: OPHTHALMOLOGY

## 2019-11-11 ENCOUNTER — AMBUL SURGICAL CARE (OUTPATIENT)
Dept: URBAN - METROPOLITAN AREA SURGERY 32 | Facility: SURGERY | Age: 64
Setting detail: OPHTHALMOLOGY
End: 2019-11-11
Payer: COMMERCIAL

## 2019-11-11 DIAGNOSIS — H52.221: ICD-10-CM

## 2019-11-11 DIAGNOSIS — H25.11: ICD-10-CM

## 2019-11-11 DIAGNOSIS — H25.011: ICD-10-CM

## 2019-11-11 PROCEDURE — 66984 XCAPSL CTRC RMVL W/O ECP: CPT | Performed by: OPHTHALMOLOGY

## 2019-11-11 PROCEDURE — LENSX LASER ASSISTED CATARACT EXTRACTION W/POSSIBLE IOL IMPLANT: Performed by: OPHTHALMOLOGY

## 2019-11-11 PROCEDURE — G8907 PT DOC NO EVENTS ON DISCHARG: HCPCS | Performed by: OPHTHALMOLOGY

## 2019-11-11 PROCEDURE — G8918 PT W/O PREOP ORDER IV AB PRO: HCPCS | Performed by: OPHTHALMOLOGY

## 2019-11-12 ENCOUNTER — RX ONLY (RX ONLY)
Age: 64
End: 2019-11-12

## 2019-11-12 ENCOUNTER — DOCTOR'S OFFICE (OUTPATIENT)
Dept: URBAN - NONMETROPOLITAN AREA CLINIC 1 | Facility: CLINIC | Age: 64
Setting detail: OPHTHALMOLOGY
End: 2019-11-12
Payer: COMMERCIAL

## 2019-11-12 DIAGNOSIS — H25.12: ICD-10-CM

## 2019-11-12 DIAGNOSIS — H25.012: ICD-10-CM

## 2019-11-12 PROCEDURE — 99024 POSTOP FOLLOW-UP VISIT: CPT | Performed by: OPHTHALMOLOGY

## 2019-11-12 PROCEDURE — 92136 OPHTHALMIC BIOMETRY: CPT | Performed by: OPHTHALMOLOGY

## 2019-11-12 ASSESSMENT — REFRACTION_CURRENTRX
OD_ADD: +2.50
OS_ADD: +2.50
OS_ADD: +2.50
OD_CYLINDER: -0.50
OD_SPHERE: -1.00
OD_OVR_VA: 20/
OS_SPHERE: -1.25
OD_OVR_VA: 20/
OS_OVR_VA: 20/
OD_ADD: +2.50
OS_OVR_VA: 20/
OS_AXIS: 158
OS_VPRISM_DIRECTION: PROGS
OS_VPRISM_DIRECTION: PROGS
OS_CYLINDER: -0.50
OS_AXIS: 101
OS_OVR_VA: 20/
OD_VPRISM_DIRECTION: PROGS
OD_OVR_VA: 20/
OS_SPHERE: -1.75
OS_CYLINDER: -0.75
OD_AXIS: 50
OD_VPRISM_DIRECTION: PROGS
OD_CYLINDER: -0.50
OD_AXIS: 067
OD_SPHERE: -0.75

## 2019-11-12 ASSESSMENT — REFRACTION_AUTOREFRACTION
OS_SPHERE: -0.75
OD_CYLINDER: -0.25
OD_AXIS: 174
OS_AXIS: 176
OD_SPHERE: +1.00
OS_CYLINDER: -0.50

## 2019-11-12 ASSESSMENT — REFRACTION_MANIFEST
OD_VA3: 20/
OS_VA1: 20/20-2
OS_ADD: +2.50
OD_CYLINDER: -0.75
OD_ADD: +2.50
OS_VA3: 20/
OS_SPHERE: -1.50
OS_VA3: 20/
OD_AXIS: 030
OU_VA: 20/
OS_VA2: 20/20-2
OD_VA2: 20/20-2
OS_CYLINDER: -0.25
OD_VA2: 20/
OD_VA1: 20/
OS_AXIS: 163
OD_VA1: 20/20-2
OS_VA2: 20/
OU_VA: 20/
OD_SPHERE: -1.00
OD_VA3: 20/
OS_VA1: 20/

## 2019-11-12 ASSESSMENT — SPHEQUIV_DERIVED
OS_SPHEQUIV: -1.625
OS_SPHEQUIV: -1
OD_SPHEQUIV: 0.875
OD_SPHEQUIV: -1.375

## 2019-11-12 ASSESSMENT — LID EXAM ASSESSMENTS
OS_BLEPHARITIS: T
OS_MEIBOMITIS: 1+
OD_BLEPHARITIS: T

## 2019-11-12 ASSESSMENT — SUPERFICIAL PUNCTATE KERATITIS (SPK)
OD_SPK: T
OS_SPK: 1+

## 2019-11-12 ASSESSMENT — LID POSITION - PTOSIS
OS_PTOSIS: 1+
OD_PTOSIS: 2+

## 2019-11-12 ASSESSMENT — VISUAL ACUITY
OS_BCVA: 20/25+2
OD_BCVA: 20/25

## 2019-11-12 ASSESSMENT — DECREASING TEAR LAKE - SEVERITY SCORE: OD_DEC_TEARLAKE: T

## 2019-11-12 ASSESSMENT — LID POSITION - COMMENTS
OS_COMMENTS: BILATERAL TARSORRAPHY, MRD 1= .5/2
OD_COMMENTS: BILATERAL TARSORRAPHY, MRD 1= .5/2

## 2019-11-13 ENCOUNTER — AMBUL SURGICAL CARE (OUTPATIENT)
Dept: URBAN - METROPOLITAN AREA SURGERY 32 | Facility: SURGERY | Age: 64
Setting detail: OPHTHALMOLOGY
End: 2019-11-13
Payer: COMMERCIAL

## 2019-11-13 DIAGNOSIS — H25.12: ICD-10-CM

## 2019-11-13 PROCEDURE — LENSX LASER ASSISTED CATARACT EXTRACTION W/POSSIBLE IOL IMPLANT: Performed by: OPHTHALMOLOGY

## 2019-11-20 ENCOUNTER — DOCTOR'S OFFICE (OUTPATIENT)
Dept: URBAN - NONMETROPOLITAN AREA CLINIC 1 | Facility: CLINIC | Age: 64
Setting detail: OPHTHALMOLOGY
End: 2019-11-20
Payer: COMMERCIAL

## 2019-11-20 DIAGNOSIS — H25.12: ICD-10-CM

## 2019-11-20 DIAGNOSIS — Z96.1: ICD-10-CM

## 2019-11-20 DIAGNOSIS — H25.012: ICD-10-CM

## 2019-11-20 PROCEDURE — 99024 POSTOP FOLLOW-UP VISIT: CPT | Performed by: OPHTHALMOLOGY

## 2019-11-20 ASSESSMENT — REFRACTION_CURRENTRX
OS_SPHERE: -1.25
OD_OVR_VA: 20/
OS_OVR_VA: 20/
OD_SPHERE: -1.00
OD_AXIS: 50
OD_CYLINDER: -0.50
OS_ADD: +2.50
OD_AXIS: 067
OD_VPRISM_DIRECTION: PROGS
OS_OVR_VA: 20/
OS_AXIS: 158
OD_OVR_VA: 20/
OS_AXIS: 101
OD_CYLINDER: -0.50
OD_OVR_VA: 20/
OS_CYLINDER: -0.75
OD_ADD: +2.50
OS_OVR_VA: 20/
OD_SPHERE: -0.75
OS_ADD: +2.50
OS_VPRISM_DIRECTION: PROGS
OD_VPRISM_DIRECTION: PROGS
OS_SPHERE: -1.75
OS_CYLINDER: -0.50
OD_ADD: +2.50
OS_VPRISM_DIRECTION: PROGS

## 2019-11-20 ASSESSMENT — REFRACTION_MANIFEST
OS_VA3: 20/
OD_ADD: +2.50
OS_VA1: 20/20-2
OS_ADD: +2.50
OU_VA: 20/
OD_VA1: 20/20-2
OS_VA2: 20/
OD_VA2: 20/20-2
OS_VA1: 20/
OS_AXIS: 163
OD_VA1: 20/
OS_VA3: 20/
OS_SPHERE: -1.50
OU_VA: 20/
OD_AXIS: 030
OD_CYLINDER: -0.75
OS_CYLINDER: -0.25
OD_VA2: 20/
OD_SPHERE: -1.00
OD_VA3: 20/
OS_VA2: 20/20-2
OD_VA3: 20/

## 2019-11-20 ASSESSMENT — SPHEQUIV_DERIVED
OS_SPHEQUIV: -1.625
OD_SPHEQUIV: -1.375
OD_SPHEQUIV: 0
OS_SPHEQUIV: -1

## 2019-11-20 ASSESSMENT — REFRACTION_AUTOREFRACTION
OS_CYLINDER: -0.50
OS_SPHERE: -0.75
OD_SPHERE: +0.25
OS_AXIS: 176
OD_AXIS: 052
OD_CYLINDER: -0.50

## 2019-11-20 ASSESSMENT — LID POSITION - COMMENTS
OS_COMMENTS: BILATERAL TARSORRAPHY, MRD 1= .5/2
OD_COMMENTS: BILATERAL TARSORRAPHY, MRD 1= .5/2

## 2019-11-20 ASSESSMENT — CONFRONTATIONAL VISUAL FIELD TEST (CVF)
OD_FINDINGS: FULL
OS_FINDINGS: FULL

## 2019-11-20 ASSESSMENT — SUPERFICIAL PUNCTATE KERATITIS (SPK)
OD_SPK: T
OS_SPK: 1+

## 2019-11-20 ASSESSMENT — LID EXAM ASSESSMENTS
OS_MEIBOMITIS: 1+
OS_BLEPHARITIS: T
OD_BLEPHARITIS: T

## 2019-11-20 ASSESSMENT — LID POSITION - PTOSIS
OS_PTOSIS: 1+
OD_PTOSIS: 2+

## 2019-11-20 ASSESSMENT — VISUAL ACUITY
OD_BCVA: 20/25
OS_BCVA: 20/20-1

## 2019-11-20 ASSESSMENT — DECREASING TEAR LAKE - SEVERITY SCORE: OD_DEC_TEARLAKE: T

## 2019-11-25 ENCOUNTER — AMBUL SURGICAL CARE (OUTPATIENT)
Dept: URBAN - METROPOLITAN AREA SURGERY 32 | Facility: SURGERY | Age: 64
Setting detail: OPHTHALMOLOGY
End: 2019-11-25
Payer: COMMERCIAL

## 2019-11-25 ENCOUNTER — DOCTOR'S OFFICE (OUTPATIENT)
Dept: URBAN - METROPOLITAN AREA CLINIC 136 | Facility: CLINIC | Age: 64
Setting detail: OPHTHALMOLOGY
End: 2019-11-25
Payer: COMMERCIAL

## 2019-11-25 DIAGNOSIS — H02.105: ICD-10-CM

## 2019-11-25 DIAGNOSIS — H25.012: ICD-10-CM

## 2019-11-25 DIAGNOSIS — H02.423: ICD-10-CM

## 2019-11-25 DIAGNOSIS — H04.123: ICD-10-CM

## 2019-11-25 DIAGNOSIS — H02.055: ICD-10-CM

## 2019-11-25 DIAGNOSIS — H52.222: ICD-10-CM

## 2019-11-25 DIAGNOSIS — Z96.1: ICD-10-CM

## 2019-11-25 DIAGNOSIS — H25.12: ICD-10-CM

## 2019-11-25 PROCEDURE — 66984 XCAPSL CTRC RMVL W/O ECP: CPT | Performed by: OPHTHALMOLOGY

## 2019-11-25 PROCEDURE — G8907 PT DOC NO EVENTS ON DISCHARG: HCPCS | Performed by: OPHTHALMOLOGY

## 2019-11-25 PROCEDURE — 92012 INTRM OPH EXAM EST PATIENT: CPT | Performed by: OPHTHALMOLOGY

## 2019-11-25 PROCEDURE — 67820 REVISE EYELASHES: CPT | Performed by: OPHTHALMOLOGY

## 2019-11-25 PROCEDURE — LENSX LASER ASSISTED CATARACT EXTRACTION W/POSSIBLE IOL IMPLANT: Performed by: OPHTHALMOLOGY

## 2019-11-25 PROCEDURE — G8918 PT W/O PREOP ORDER IV AB PRO: HCPCS | Performed by: OPHTHALMOLOGY

## 2019-11-25 ASSESSMENT — DECREASING TEAR LAKE - SEVERITY SCORE: OD_DEC_TEARLAKE: T

## 2019-11-25 ASSESSMENT — REFRACTION_CURRENTRX
OS_ADD: +2.50
OS_SPHERE: -1.75
OS_AXIS: 101
OD_SPHERE: -1.00
OD_AXIS: 067
OD_ADD: +2.50
OD_CYLINDER: -0.50
OD_VPRISM_DIRECTION: PROGS
OS_VPRISM_DIRECTION: PROGS
OS_ADD: +2.50
OS_VPRISM_DIRECTION: PROGS
OD_VPRISM_DIRECTION: PROGS
OS_OVR_VA: 20/
OS_OVR_VA: 20/
OD_SPHERE: -0.75
OD_OVR_VA: 20/
OS_OVR_VA: 20/
OD_CYLINDER: -0.50
OD_OVR_VA: 20/
OS_CYLINDER: -0.75
OS_AXIS: 158
OD_OVR_VA: 20/
OD_ADD: +2.50
OS_CYLINDER: -0.50
OD_AXIS: 50
OS_SPHERE: -1.25

## 2019-11-25 ASSESSMENT — LID POSITION - PTOSIS
OD_PTOSIS: 2+
OS_PTOSIS: 1+

## 2019-11-25 ASSESSMENT — REFRACTION_MANIFEST
OS_AXIS: 163
OS_SPHERE: -1.50
OD_SPHERE: -1.00
OU_VA: 20/
OS_VA2: 20/20-2
OS_VA1: 20/
OD_AXIS: 030
OS_VA3: 20/
OU_VA: 20/
OD_VA2: 20/
OS_VA1: 20/20-2
OS_VA2: 20/
OS_CYLINDER: -0.25
OD_CYLINDER: -0.75
OS_VA3: 20/
OD_VA1: 20/20-2
OD_VA2: 20/20-2
OD_VA3: 20/
OD_VA3: 20/
OD_ADD: +2.50
OD_VA1: 20/
OS_ADD: +2.50

## 2019-11-25 ASSESSMENT — REFRACTION_AUTOREFRACTION
OD_SPHERE: +0.25
OD_CYLINDER: -0.50
OD_AXIS: 052
OS_SPHERE: -0.75
OS_AXIS: 176
OS_CYLINDER: -0.50

## 2019-11-25 ASSESSMENT — LID EXAM ASSESSMENTS
OS_BLEPHARITIS: T
OS_TRICHIASIS: T
OD_BLEPHARITIS: T
OS_MEIBOMITIS: 1+

## 2019-11-25 ASSESSMENT — SPHEQUIV_DERIVED
OS_SPHEQUIV: -1
OD_SPHEQUIV: -1.375
OD_SPHEQUIV: 0
OS_SPHEQUIV: -1.625

## 2019-11-25 ASSESSMENT — SUPERFICIAL PUNCTATE KERATITIS (SPK)
OD_SPK: T
OS_SPK: 1+

## 2019-11-25 ASSESSMENT — LID POSITION - COMMENTS: OD_COMMENTS: BILATERAL TARSORRAPHY, MRD 1= .5/2

## 2019-11-25 ASSESSMENT — CONFRONTATIONAL VISUAL FIELD TEST (CVF)
OD_FINDINGS: FULL
OS_FINDINGS: FULL

## 2019-11-25 ASSESSMENT — VISUAL ACUITY
OS_BCVA: 20/20-1
OD_BCVA: 20/25

## 2019-11-26 ENCOUNTER — RX ONLY (RX ONLY)
Age: 64
End: 2019-11-26

## 2019-11-26 ENCOUNTER — DOCTOR'S OFFICE (OUTPATIENT)
Dept: URBAN - NONMETROPOLITAN AREA CLINIC 1 | Facility: CLINIC | Age: 64
Setting detail: OPHTHALMOLOGY
End: 2019-11-26
Payer: COMMERCIAL

## 2019-11-26 DIAGNOSIS — Z96.1: ICD-10-CM

## 2019-11-26 DIAGNOSIS — H02.055: ICD-10-CM

## 2019-11-26 PROCEDURE — 99024 POSTOP FOLLOW-UP VISIT: CPT | Performed by: OPHTHALMOLOGY

## 2019-11-26 ASSESSMENT — REFRACTION_CURRENTRX
OS_VPRISM_DIRECTION: PROGS
OD_VPRISM_DIRECTION: PROGS
OD_SPHERE: -1.00
OS_CYLINDER: -0.75
OD_OVR_VA: 20/
OS_OVR_VA: 20/
OD_AXIS: 067
OD_CYLINDER: -0.50
OS_OVR_VA: 20/
OS_ADD: +2.50
OS_AXIS: 158
OS_VPRISM_DIRECTION: PROGS
OS_SPHERE: -1.25
OS_AXIS: 101
OS_CYLINDER: -0.50
OD_SPHERE: -0.75
OD_ADD: +2.50
OS_OVR_VA: 20/
OS_ADD: +2.50
OS_SPHERE: -1.75
OD_CYLINDER: -0.50
OD_VPRISM_DIRECTION: PROGS
OD_AXIS: 50
OD_OVR_VA: 20/
OD_OVR_VA: 20/
OD_ADD: +2.50

## 2019-11-26 ASSESSMENT — REFRACTION_MANIFEST
OD_VA2: 20/20-2
OD_ADD: +2.50
OD_AXIS: 030
OS_VA1: 20/
OS_VA3: 20/
OD_SPHERE: -1.00
OS_AXIS: 163
OD_VA1: 20/20-2
OD_VA2: 20/
OS_SPHERE: -1.50
OD_VA3: 20/
OS_VA2: 20/
OU_VA: 20/
OU_VA: 20/
OS_VA3: 20/
OS_CYLINDER: -0.25
OD_VA1: 20/
OD_VA3: 20/
OD_CYLINDER: -0.75
OS_VA2: 20/20-2
OS_ADD: +2.50
OS_VA1: 20/20-2

## 2019-11-26 ASSESSMENT — SPHEQUIV_DERIVED
OS_SPHEQUIV: -1.625
OS_SPHEQUIV: 1
OD_SPHEQUIV: 0.375
OD_SPHEQUIV: -1.375

## 2019-11-26 ASSESSMENT — LID POSITION - PTOSIS
OS_PTOSIS: 1+
OD_PTOSIS: 2+

## 2019-11-26 ASSESSMENT — LID EXAM ASSESSMENTS
OS_BLEPHARITIS: T
OS_TRICHIASIS: T
OS_MEIBOMITIS: 1+
OD_BLEPHARITIS: T

## 2019-11-26 ASSESSMENT — REFRACTION_AUTOREFRACTION
OS_AXIS: 097
OD_SPHERE: +0.50
OD_CYLINDER: -0.25
OS_CYLINDER: -0.50
OS_SPHERE: +1.25
OD_AXIS: 013

## 2019-11-26 ASSESSMENT — SUPERFICIAL PUNCTATE KERATITIS (SPK)
OD_SPK: T
OS_SPK: 1+

## 2019-11-26 ASSESSMENT — VISUAL ACUITY
OD_BCVA: 20/40
OS_BCVA: 20/15

## 2019-11-26 ASSESSMENT — DECREASING TEAR LAKE - SEVERITY SCORE: OD_DEC_TEARLAKE: T

## 2019-11-26 ASSESSMENT — LID POSITION - COMMENTS: OD_COMMENTS: BILATERAL TARSORRAPHY, MRD 1= .5/2

## 2019-12-04 ENCOUNTER — DOCTOR'S OFFICE (OUTPATIENT)
Dept: URBAN - NONMETROPOLITAN AREA CLINIC 1 | Facility: CLINIC | Age: 64
Setting detail: OPHTHALMOLOGY
End: 2019-12-04
Payer: COMMERCIAL

## 2019-12-04 ENCOUNTER — RX ONLY (RX ONLY)
Age: 64
End: 2019-12-04

## 2019-12-04 DIAGNOSIS — H04.121: ICD-10-CM

## 2019-12-04 DIAGNOSIS — Z96.1: ICD-10-CM

## 2019-12-04 DIAGNOSIS — H40.013: ICD-10-CM

## 2019-12-04 DIAGNOSIS — H04.123: ICD-10-CM

## 2019-12-04 DIAGNOSIS — H04.122: ICD-10-CM

## 2019-12-04 PROCEDURE — 99024 POSTOP FOLLOW-UP VISIT: CPT | Performed by: PHYSICIAN ASSISTANT

## 2019-12-04 ASSESSMENT — CONFRONTATIONAL VISUAL FIELD TEST (CVF)
OD_FINDINGS: FULL
OS_FINDINGS: FULL

## 2019-12-04 ASSESSMENT — LID POSITION - COMMENTS: OD_COMMENTS: BILATERAL TARSORRAPHY, MRD 1= .5/2

## 2019-12-04 ASSESSMENT — LID EXAM ASSESSMENTS
OS_BLEPHARITIS: T
OS_MEIBOMITIS: 1+
OD_BLEPHARITIS: T
OS_TRICHIASIS: T

## 2019-12-04 ASSESSMENT — LID POSITION - PTOSIS
OD_PTOSIS: 1+ 2+
OS_PTOSIS: 1+

## 2019-12-04 ASSESSMENT — SUPERFICIAL PUNCTATE KERATITIS (SPK): OD_SPK: T

## 2019-12-05 ASSESSMENT — REFRACTION_AUTOREFRACTION
OS_SPHERE: +0.25
OS_AXIS: 121
OD_CYLINDER: -0.25
OS_CYLINDER: -0.25
OD_SPHERE: -0.25
OD_AXIS: 40

## 2019-12-05 ASSESSMENT — REFRACTION_CURRENTRX
OD_SPHERE: -0.75
OS_VPRISM_DIRECTION: PROGS
OD_AXIS: 067
OD_ADD: +2.50
OS_VPRISM_DIRECTION: PROGS
OD_OVR_VA: 20/
OD_OVR_VA: 20/
OS_OVR_VA: 20/
OS_OVR_VA: 20/
OD_VPRISM_DIRECTION: PROGS
OS_AXIS: 101
OD_CYLINDER: -0.50
OD_OVR_VA: 20/
OD_ADD: +2.50
OD_VPRISM_DIRECTION: PROGS
OS_CYLINDER: -0.50
OS_SPHERE: -1.75
OD_SPHERE: -1.00
OS_SPHERE: -1.25
OD_CYLINDER: -0.50
OS_CYLINDER: -0.75
OS_AXIS: 158
OS_ADD: +2.50
OS_ADD: +2.50
OS_OVR_VA: 20/
OD_AXIS: 50

## 2019-12-05 ASSESSMENT — REFRACTION_MANIFEST
OD_VA2: 20/20-2
OD_VA2: 20/
OU_VA: 20/
OD_VA1: 20/
OS_AXIS: 163
OU_VA: 20/
OD_ADD: +2.50
OS_SPHERE: -1.50
OD_AXIS: 030
OD_VA1: 20/20-2
OS_VA3: 20/
OS_VA1: 20/
OS_VA2: 20/
OD_VA3: 20/
OD_CYLINDER: -0.75
OS_CYLINDER: -0.25
OD_VA3: 20/
OS_VA1: 20/20-2
OS_ADD: +2.50
OS_VA3: 20/
OD_SPHERE: -1.00
OS_VA2: 20/20-2

## 2019-12-05 ASSESSMENT — SPHEQUIV_DERIVED
OD_SPHEQUIV: -1.375
OS_SPHEQUIV: 0.125
OS_SPHEQUIV: -1.625
OD_SPHEQUIV: -0.375

## 2019-12-05 ASSESSMENT — VISUAL ACUITY
OS_BCVA: 20/20
OD_BCVA: 20/20

## 2019-12-10 ENCOUNTER — DOCTOR'S OFFICE (OUTPATIENT)
Dept: URBAN - NONMETROPOLITAN AREA CLINIC 1 | Facility: CLINIC | Age: 64
Setting detail: OPHTHALMOLOGY
End: 2019-12-10
Payer: COMMERCIAL

## 2019-12-10 DIAGNOSIS — H04.123: ICD-10-CM

## 2019-12-10 DIAGNOSIS — H02.105: ICD-10-CM

## 2019-12-10 DIAGNOSIS — H02.423: ICD-10-CM

## 2019-12-10 DIAGNOSIS — Z96.1: ICD-10-CM

## 2019-12-10 PROCEDURE — 92012 INTRM OPH EXAM EST PATIENT: CPT | Performed by: OPHTHALMOLOGY

## 2019-12-10 ASSESSMENT — REFRACTION_CURRENTRX
OS_SPHERE: -1.75
OD_SPHERE: -1.00
OD_ADD: +2.50
OS_CYLINDER: -0.50
OD_SPHERE: -0.75
OD_AXIS: 067
OS_AXIS: 158
OD_OVR_VA: 20/
OD_VPRISM_DIRECTION: PROGS
OS_AXIS: 101
OS_OVR_VA: 20/
OD_ADD: +2.50
OS_OVR_VA: 20/
OD_CYLINDER: -0.50
OD_VPRISM_DIRECTION: PROGS
OS_OVR_VA: 20/
OS_VPRISM_DIRECTION: PROGS
OS_ADD: +2.50
OD_AXIS: 50
OD_OVR_VA: 20/
OS_ADD: +2.50
OS_CYLINDER: -0.75
OD_OVR_VA: 20/
OD_CYLINDER: -0.50
OS_VPRISM_DIRECTION: PROGS
OS_SPHERE: -1.25

## 2019-12-10 ASSESSMENT — LID EXAM ASSESSMENTS
OS_BLEPHARITIS: T
OS_TRICHIASIS: ABSENT
OD_BLEPHARITIS: T
OS_MEIBOMITIS: 1+

## 2019-12-10 ASSESSMENT — REFRACTION_MANIFEST
OD_VA1: 20/
OS_VA2: 20/20-2
OS_VA3: 20/
OS_SPHERE: -1.50
OD_AXIS: 030
OS_VA2: 20/
OU_VA: 20/
OD_ADD: +2.50
OS_CYLINDER: -0.25
OU_VA: 20/
OS_VA3: 20/
OS_VA1: 20/20-2
OD_VA2: 20/
OD_VA1: 20/20-2
OD_VA3: 20/
OS_ADD: +2.50
OD_CYLINDER: -0.75
OD_VA2: 20/20-2
OS_AXIS: 163
OD_VA3: 20/
OS_VA1: 20/
OD_SPHERE: -1.00

## 2019-12-10 ASSESSMENT — CONFRONTATIONAL VISUAL FIELD TEST (CVF)
OS_FINDINGS: FULL
OD_FINDINGS: FULL

## 2019-12-10 ASSESSMENT — SPHEQUIV_DERIVED
OS_SPHEQUIV: -0.125
OS_SPHEQUIV: -1.625
OD_SPHEQUIV: -0.5
OD_SPHEQUIV: -1.375

## 2019-12-10 ASSESSMENT — VISUAL ACUITY
OS_BCVA: 20/20
OD_BCVA: 20/20

## 2019-12-10 ASSESSMENT — REFRACTION_AUTOREFRACTION
OD_CYLINDER: -0.50
OD_AXIS: 025
OD_SPHERE: -0.25
OS_SPHERE: 0.00
OS_CYLINDER: -0.25
OS_AXIS: 056

## 2019-12-10 ASSESSMENT — LID POSITION - PTOSIS
OD_PTOSIS: 1+ 2+
OS_PTOSIS: 1+

## 2019-12-10 ASSESSMENT — SUPERFICIAL PUNCTATE KERATITIS (SPK)
OD_SPK: ABSENT
OS_SPK: ABSENT

## 2019-12-27 ENCOUNTER — DOCTOR'S OFFICE (OUTPATIENT)
Dept: URBAN - NONMETROPOLITAN AREA CLINIC 1 | Facility: CLINIC | Age: 64
Setting detail: OPHTHALMOLOGY
End: 2019-12-27
Payer: COMMERCIAL

## 2019-12-27 VITALS — HEIGHT: 55 IN

## 2019-12-27 DIAGNOSIS — H04.123: ICD-10-CM

## 2019-12-27 DIAGNOSIS — H02.423: ICD-10-CM

## 2019-12-27 DIAGNOSIS — Z96.1: ICD-10-CM

## 2019-12-27 DIAGNOSIS — H02.105: ICD-10-CM

## 2019-12-27 PROCEDURE — 92012 INTRM OPH EXAM EST PATIENT: CPT | Performed by: OPHTHALMOLOGY

## 2019-12-27 ASSESSMENT — REFRACTION_MANIFEST
OD_VA2: 20/20-2
OS_VA3: 20/
OS_ADD: +2.50
OS_CYLINDER: -0.25
OD_VA3: 20/
OS_AXIS: 163
OD_AXIS: 030
OD_CYLINDER: -0.75
OS_VA1: 20/20-2
OD_ADD: +2.50
OS_SPHERE: -1.50
OD_SPHERE: -1.00
OU_VA: 20/
OD_VA1: 20/20-2
OS_VA2: 20/20-2

## 2019-12-27 ASSESSMENT — REFRACTION_AUTOREFRACTION
OS_CYLINDER: -0.25
OD_AXIS: 025
OS_AXIS: 056
OD_CYLINDER: -0.50
OD_SPHERE: -0.25
OS_SPHERE: 0.00

## 2019-12-27 ASSESSMENT — LID EXAM ASSESSMENTS
OS_MEIBOMITIS: 1+
OD_BLEPHARITIS: ABSENT
OS_BLEPHARITIS: ABSENT
OS_TRICHIASIS: ABSENT

## 2019-12-27 ASSESSMENT — REFRACTION_CURRENTRX
OD_ADD: +2.50
OS_ADD: +2.50
OD_CYLINDER: -0.50
OD_OVR_VA: 20/
OD_SPHERE: -0.75
OS_SPHERE: -1.25
OS_CYLINDER: -0.75
OD_OVR_VA: 20/
OD_VPRISM_DIRECTION: PROGS
OD_SPHERE: -1.00
OS_VPRISM_DIRECTION: PROGS
OD_AXIS: 50
OD_ADD: +2.50
OD_VPRISM_DIRECTION: PROGS
OS_ADD: +2.50
OS_VPRISM_DIRECTION: PROGS
OD_AXIS: 067
OS_AXIS: 158
OS_CYLINDER: -0.50
OS_AXIS: 101
OS_OVR_VA: 20/
OS_OVR_VA: 20/
OD_CYLINDER: -0.50
OS_SPHERE: -1.75

## 2019-12-27 ASSESSMENT — VISUAL ACUITY
OS_BCVA: 20/25
OD_BCVA: 20/25

## 2019-12-27 ASSESSMENT — SUPERFICIAL PUNCTATE KERATITIS (SPK)
OD_SPK: ABSENT
OS_SPK: ABSENT

## 2019-12-27 ASSESSMENT — SPHEQUIV_DERIVED
OD_SPHEQUIV: -1.375
OS_SPHEQUIV: -1.625
OD_SPHEQUIV: -0.5
OS_SPHEQUIV: -0.125

## 2019-12-27 ASSESSMENT — CONFRONTATIONAL VISUAL FIELD TEST (CVF)
OS_FINDINGS: FULL
OD_FINDINGS: FULL

## 2019-12-27 ASSESSMENT — LID POSITION - PTOSIS
OD_PTOSIS: 1+ 2+
OS_PTOSIS: 1+

## 2020-01-27 ENCOUNTER — DOCTOR'S OFFICE (OUTPATIENT)
Dept: URBAN - NONMETROPOLITAN AREA CLINIC 1 | Facility: CLINIC | Age: 65
Setting detail: OPHTHALMOLOGY
End: 2020-01-27
Payer: COMMERCIAL

## 2020-01-27 ENCOUNTER — RX ONLY (RX ONLY)
Age: 65
End: 2020-01-27

## 2020-01-27 DIAGNOSIS — H52.4: ICD-10-CM

## 2020-01-27 DIAGNOSIS — H52.223: ICD-10-CM

## 2020-01-27 PROCEDURE — 92015 DETERMINE REFRACTIVE STATE: CPT | Performed by: OPTOMETRIST

## 2020-01-27 ASSESSMENT — VISUAL ACUITY
OD_BCVA: 20/20
OS_BCVA: 20/25-1

## 2020-01-27 ASSESSMENT — REFRACTION_CURRENTRX
OS_VPRISM_DIRECTION: PROGS
OD_OVR_VA: 20/
OS_OVR_VA: 20/
OS_CYLINDER: -0.75
OD_SPHERE: -0.75
OS_ADD: +2.50
OS_ADD: +2.50
OD_AXIS: 50
OD_VPRISM_DIRECTION: PROGS
OS_SPHERE: -1.75
OD_CYLINDER: -0.50
OD_SPHERE: -1.00
OD_ADD: +2.50
OS_CYLINDER: -0.50
OS_AXIS: 101
OD_AXIS: 067
OD_ADD: +2.50
OD_OVR_VA: 20/
OS_VPRISM_DIRECTION: PROGS
OS_OVR_VA: 20/
OS_SPHERE: -1.25
OD_CYLINDER: -0.50
OD_VPRISM_DIRECTION: PROGS
OS_AXIS: 158

## 2020-01-27 ASSESSMENT — REFRACTION_MANIFEST
OD_VA1: 20/20-2
OU_VA: 20/
OS_VA3: 20/
OD_ADD: +2.50
OD_VA2: 20/20-2
OD_SPHERE: +0.25
OS_ADD: +2.50
OD_CYLINDER: -0.25
OD_AXIS: 150
OS_SPHERE: PLANO
OS_VA2: 20/20-2
OS_AXIS: 120
OD_VA3: 20/
OS_VA1: 20/20-2
OS_CYLINDER: -0.25

## 2020-01-27 ASSESSMENT — REFRACTION_AUTOREFRACTION
OD_AXIS: 153
OD_CYLINDER: -0.75
OS_SPHERE: +0.50
OD_SPHERE: +0.50
OS_CYLINDER: -0.50
OS_AXIS: 119

## 2020-01-27 ASSESSMENT — SPHEQUIV_DERIVED
OD_SPHEQUIV: 0.125
OS_SPHEQUIV: 0.25
OD_SPHEQUIV: 0.125

## 2020-02-11 ENCOUNTER — OPTICAL OFFICE (OUTPATIENT)
Dept: URBAN - NONMETROPOLITAN AREA CLINIC 4 | Facility: CLINIC | Age: 65
Setting detail: OPHTHALMOLOGY
End: 2020-02-11
Payer: COMMERCIAL

## 2020-02-11 ENCOUNTER — OPTICAL (OUTPATIENT)
Dept: URBAN - NONMETROPOLITAN AREA CLINIC 6 | Facility: CLINIC | Age: 65
Setting detail: OPHTHALMOLOGY
End: 2020-02-11
Payer: COMMERCIAL

## 2020-02-11 DIAGNOSIS — H52.223: ICD-10-CM

## 2020-02-11 DIAGNOSIS — Z96.1: ICD-10-CM

## 2020-02-11 PROCEDURE — V2781 PROGRESSIVE LENS PER LENS: HCPCS | Performed by: OPTOMETRIST

## 2020-02-11 PROCEDURE — V2203 LENS SPHCYL BIFOCAL 4.00D/.1: HCPCS | Performed by: OPTOMETRIST

## 2020-02-11 PROCEDURE — V2020 VISION SVCS FRAMES PURCHASES: HCPCS | Performed by: OPTOMETRIST

## 2020-02-11 PROCEDURE — V2744 TINT PHOTOCHROMATIC LENS/ES: HCPCS | Performed by: OPTOMETRIST

## 2020-02-11 PROCEDURE — V2750 ANTI-REFLECTIVE COATING: HCPCS | Performed by: OPTOMETRIST

## 2020-02-18 ENCOUNTER — OPTICAL OFFICE (OUTPATIENT)
Dept: URBAN - NONMETROPOLITAN AREA CLINIC 4 | Facility: CLINIC | Age: 65
Setting detail: OPHTHALMOLOGY
End: 2020-02-18

## 2020-02-18 DIAGNOSIS — H52.7: ICD-10-CM

## 2020-02-18 PROCEDURE — V2799T TAXABLE VISION SERVICE MISCELLANEOUS: Performed by: OPHTHALMOLOGY

## 2020-03-17 ENCOUNTER — DOCTOR'S OFFICE (OUTPATIENT)
Dept: URBAN - METROPOLITAN AREA CLINIC 136 | Facility: CLINIC | Age: 65
Setting detail: OPHTHALMOLOGY
End: 2020-03-17
Payer: COMMERCIAL

## 2020-03-17 DIAGNOSIS — H04.121: ICD-10-CM

## 2020-03-17 DIAGNOSIS — H35.371: ICD-10-CM

## 2020-03-17 DIAGNOSIS — H04.123: ICD-10-CM

## 2020-03-17 DIAGNOSIS — H35.3111: ICD-10-CM

## 2020-03-17 DIAGNOSIS — D31.32: ICD-10-CM

## 2020-03-17 DIAGNOSIS — H04.122: ICD-10-CM

## 2020-03-17 DIAGNOSIS — H35.3122: ICD-10-CM

## 2020-03-17 PROCEDURE — 92014 COMPRE OPH EXAM EST PT 1/>: CPT | Performed by: OPHTHALMOLOGY

## 2020-03-17 PROCEDURE — 92250 FUNDUS PHOTOGRAPHY W/I&R: CPT | Performed by: OPHTHALMOLOGY

## 2020-03-17 PROCEDURE — 92134 CPTRZ OPH DX IMG PST SGM RTA: CPT | Performed by: OPHTHALMOLOGY

## 2020-03-17 ASSESSMENT — REFRACTION_CURRENTRX
OS_VPRISM_DIRECTION: PROGS
OD_SPHERE: +0.25
OS_OVR_VA: 20/
OS_CYLINDER: -0.25
OS_ADD: +2.50
OS_AXIS: 120
OD_VPRISM_DIRECTION: PROGS
OS_AXIS: 158
OS_ADD: +2.50
OS_OVR_VA: 20/
OS_SPHERE: PLANO
OD_SPHERE: -1.00
OS_SPHERE: -1.75
OS_VPRISM_DIRECTION: PROGS
OD_OVR_VA: 20/
OD_AXIS: 150
OD_ADD: +2.50
OD_CYLINDER: -0.25
OS_CYLINDER: -0.75
OD_AXIS: 50
OD_ADD: +2.50
OD_OVR_VA: 20/
OD_CYLINDER: -0.50
OD_VPRISM_DIRECTION: PROGS

## 2020-03-17 ASSESSMENT — LID EXAM ASSESSMENTS
OS_MEIBOMITIS: 1+
OS_BLEPHARITIS: ABSENT
OS_TRICHIASIS: ABSENT
OD_BLEPHARITIS: ABSENT

## 2020-03-17 ASSESSMENT — REFRACTION_MANIFEST
OD_CYLINDER: -0.25
OS_ADD: +2.50
OD_SPHERE: +0.25
OS_VA2: 20/20-2
OS_VA1: 20/20-2
OD_VA1: 20/20-2
OD_AXIS: 150
OS_CYLINDER: -0.25
OS_AXIS: 120
OD_VA2: 20/20-2
OD_ADD: +2.50
OS_SPHERE: PLANO

## 2020-03-17 ASSESSMENT — REFRACTION_AUTOREFRACTION
OD_AXIS: 153
OS_AXIS: 119
OS_CYLINDER: -0.50
OS_SPHERE: +0.50
OD_SPHERE: +0.50
OD_CYLINDER: -0.75

## 2020-03-17 ASSESSMENT — VISUAL ACUITY
OD_BCVA: 20/20-1
OS_BCVA: 20/20-2

## 2020-03-17 ASSESSMENT — LID POSITION - PTOSIS
OD_PTOSIS: 1+ 2+
OS_PTOSIS: 1+

## 2020-03-17 ASSESSMENT — SPHEQUIV_DERIVED
OS_SPHEQUIV: 0.25
OD_SPHEQUIV: 0.125
OD_SPHEQUIV: 0.125

## 2020-03-17 ASSESSMENT — PUNCTA - ASSESSMENT: OD_PUNCTA: SIL PLUG

## 2020-03-17 ASSESSMENT — SUPERFICIAL PUNCTATE KERATITIS (SPK)
OS_SPK: ABSENT
OD_SPK: ABSENT

## 2020-03-17 ASSESSMENT — CONFRONTATIONAL VISUAL FIELD TEST (CVF)
OD_FINDINGS: FULL
OS_FINDINGS: FULL

## 2020-05-07 ENCOUNTER — DOCTOR'S OFFICE (OUTPATIENT)
Dept: URBAN - NONMETROPOLITAN AREA CLINIC 1 | Facility: CLINIC | Age: 65
Setting detail: OPHTHALMOLOGY
End: 2020-05-07
Payer: COMMERCIAL

## 2020-05-07 DIAGNOSIS — H04.123: ICD-10-CM

## 2020-05-07 DIAGNOSIS — H35.371: ICD-10-CM

## 2020-05-07 DIAGNOSIS — H35.3122: ICD-10-CM

## 2020-05-07 DIAGNOSIS — H43.813: ICD-10-CM

## 2020-05-07 DIAGNOSIS — H35.3111: ICD-10-CM

## 2020-05-07 DIAGNOSIS — H04.121: ICD-10-CM

## 2020-05-07 DIAGNOSIS — H43.12: ICD-10-CM

## 2020-05-07 DIAGNOSIS — D31.32: ICD-10-CM

## 2020-05-07 DIAGNOSIS — H04.122: ICD-10-CM

## 2020-05-07 PROCEDURE — 92004 COMPRE OPH EXAM NEW PT 1/>: CPT | Performed by: OPHTHALMOLOGY

## 2020-05-07 PROCEDURE — 92014 COMPRE OPH EXAM EST PT 1/>: CPT | Performed by: OPHTHALMOLOGY

## 2020-05-07 PROCEDURE — 83861 MICROFLUID ANALY TEARS: CPT | Performed by: OPHTHALMOLOGY

## 2020-05-07 PROCEDURE — 92134 CPTRZ OPH DX IMG PST SGM RTA: CPT | Performed by: OPHTHALMOLOGY

## 2020-05-07 ASSESSMENT — REFRACTION_CURRENTRX
OS_CYLINDER: -0.75
OS_SPHERE: -1.75
OD_VPRISM_DIRECTION: PROGS
OS_AXIS: 120
OD_OVR_VA: 20/
OS_AXIS: 158
OS_VPRISM_DIRECTION: PROGS
OS_ADD: +2.50
OD_SPHERE: +0.25
OD_ADD: +2.50
OD_AXIS: 50
OD_CYLINDER: -0.50
OD_ADD: +2.50
OS_VPRISM_DIRECTION: PROGS
OD_AXIS: 150
OS_ADD: +2.50
OS_CYLINDER: -0.25
OD_VPRISM_DIRECTION: PROGS
OS_OVR_VA: 20/
OD_OVR_VA: 20/
OS_SPHERE: PLANO
OD_SPHERE: -1.00
OD_CYLINDER: -0.25
OS_OVR_VA: 20/

## 2020-05-07 ASSESSMENT — REFRACTION_MANIFEST
OS_AXIS: 120
OS_SPHERE: PLANO
OS_CYLINDER: -0.25
OD_ADD: +2.50
OD_VA1: 20/20-2
OS_ADD: +2.50
OD_CYLINDER: -0.25
OD_AXIS: 150
OD_SPHERE: +0.25
OS_VA2: 20/20-2
OD_VA2: 20/20-2
OS_VA1: 20/20-2

## 2020-05-07 ASSESSMENT — LID EXAM ASSESSMENTS
OD_BLEPHARITIS: ABSENT
OS_BLEPHARITIS: ABSENT
OS_TRICHIASIS: ABSENT
OS_MEIBOMITIS: 1+

## 2020-05-07 ASSESSMENT — TEAR BREAK UP TIME (TBUT)
OS_TBUT: 2+ 3+
OD_TBUT: 2+ 3+

## 2020-05-07 ASSESSMENT — LID POSITION - PTOSIS
OS_PTOSIS: 1+
OD_PTOSIS: 1+ 2+

## 2020-05-07 ASSESSMENT — SPHEQUIV_DERIVED
OD_SPHEQUIV: 0.125
OD_SPHEQUIV: 0.125
OS_SPHEQUIV: 0.25

## 2020-05-07 ASSESSMENT — REFRACTION_AUTOREFRACTION
OS_SPHERE: +0.50
OS_AXIS: 119
OS_CYLINDER: -0.50
OD_AXIS: 153
OD_CYLINDER: -0.75
OD_SPHERE: +0.50

## 2020-05-07 ASSESSMENT — VISUAL ACUITY
OD_BCVA: 20/25
OS_BCVA: 20/25-1

## 2020-05-07 ASSESSMENT — CONFRONTATIONAL VISUAL FIELD TEST (CVF)
OD_FINDINGS: FULL
OS_FINDINGS: FULL

## 2020-05-07 ASSESSMENT — SUPERFICIAL PUNCTATE KERATITIS (SPK)
OS_SPK: 1+ 2+
OD_SPK: 1+

## 2020-05-07 ASSESSMENT — PUNCTA - ASSESSMENT: OD_PUNCTA: SIL PLUG

## 2020-05-15 ENCOUNTER — DOCTOR'S OFFICE (OUTPATIENT)
Dept: URBAN - NONMETROPOLITAN AREA CLINIC 1 | Facility: CLINIC | Age: 65
Setting detail: OPHTHALMOLOGY
End: 2020-05-15
Payer: COMMERCIAL

## 2020-05-15 DIAGNOSIS — H35.3111: ICD-10-CM

## 2020-05-15 DIAGNOSIS — H35.371: ICD-10-CM

## 2020-05-15 DIAGNOSIS — H43.813: ICD-10-CM

## 2020-05-15 DIAGNOSIS — H43.12: ICD-10-CM

## 2020-05-15 PROCEDURE — 92014 COMPRE OPH EXAM EST PT 1/>: CPT | Performed by: OPHTHALMOLOGY

## 2020-05-15 ASSESSMENT — VISUAL ACUITY
OD_BCVA: 20/20-1
OS_BCVA: 20/20-2

## 2020-05-15 ASSESSMENT — REFRACTION_CURRENTRX
OD_SPHERE: -1.00
OD_OVR_VA: 20/
OS_VPRISM_DIRECTION: PROGS
OS_CYLINDER: -0.75
OD_SPHERE: +0.25
OS_SPHERE: -1.75
OD_VPRISM_DIRECTION: PROGS
OD_AXIS: 150
OS_OVR_VA: 20/
OS_CYLINDER: -0.25
OD_ADD: +2.50
OD_CYLINDER: -0.50
OD_ADD: +2.50
OS_AXIS: 120
OS_OVR_VA: 20/
OS_AXIS: 158
OD_OVR_VA: 20/
OS_ADD: +2.50
OS_ADD: +2.50
OS_SPHERE: PLANO
OS_VPRISM_DIRECTION: PROGS
OD_VPRISM_DIRECTION: PROGS
OD_CYLINDER: -0.25
OD_AXIS: 50

## 2020-05-15 ASSESSMENT — REFRACTION_MANIFEST
OD_VA1: 20/20-2
OS_CYLINDER: -0.25
OS_VA2: 20/20-2
OD_ADD: +2.50
OD_VA2: 20/20-2
OS_ADD: +2.50
OS_VA1: 20/20-2
OS_AXIS: 120
OD_AXIS: 150
OD_SPHERE: +0.25
OD_CYLINDER: -0.25
OS_SPHERE: PLANO

## 2020-05-15 ASSESSMENT — TEAR BREAK UP TIME (TBUT)
OS_TBUT: 2+ 3+
OD_TBUT: 2+ 3+

## 2020-05-15 ASSESSMENT — REFRACTION_AUTOREFRACTION
OD_SPHERE: +0.50
OD_CYLINDER: -0.75
OS_CYLINDER: -0.50
OD_AXIS: 153
OS_SPHERE: +0.50
OS_AXIS: 119

## 2020-05-15 ASSESSMENT — PUNCTA - ASSESSMENT: OD_PUNCTA: SIL PLUG

## 2020-05-15 ASSESSMENT — LID EXAM ASSESSMENTS
OS_MEIBOMITIS: 1+
OD_BLEPHARITIS: ABSENT
OS_TRICHIASIS: ABSENT
OS_BLEPHARITIS: ABSENT

## 2020-05-15 ASSESSMENT — LID POSITION - PTOSIS
OS_PTOSIS: 1+
OD_PTOSIS: 1+ 2+

## 2020-05-15 ASSESSMENT — CONFRONTATIONAL VISUAL FIELD TEST (CVF)
OS_FINDINGS: FULL
OD_FINDINGS: FULL

## 2020-05-15 ASSESSMENT — SPHEQUIV_DERIVED
OD_SPHEQUIV: 0.125
OS_SPHEQUIV: 0.25
OD_SPHEQUIV: 0.125

## 2020-05-15 ASSESSMENT — SUPERFICIAL PUNCTATE KERATITIS (SPK)
OD_SPK: 1+
OS_SPK: 1+ 2+

## 2020-05-26 ENCOUNTER — DOCTOR'S OFFICE (OUTPATIENT)
Dept: URBAN - NONMETROPOLITAN AREA CLINIC 1 | Facility: CLINIC | Age: 65
Setting detail: OPHTHALMOLOGY
End: 2020-05-26
Payer: COMMERCIAL

## 2020-05-26 DIAGNOSIS — H04.123: ICD-10-CM

## 2020-05-26 DIAGNOSIS — H02.105: ICD-10-CM

## 2020-05-26 DIAGNOSIS — H04.122: ICD-10-CM

## 2020-05-26 DIAGNOSIS — H04.121: ICD-10-CM

## 2020-05-26 DIAGNOSIS — H02.055: ICD-10-CM

## 2020-05-26 PROCEDURE — 99213 OFFICE O/P EST LOW 20 MIN: CPT | Performed by: OPHTHALMOLOGY

## 2020-05-26 PROCEDURE — 67820 REVISE EYELASHES: CPT | Performed by: OPHTHALMOLOGY

## 2020-05-26 ASSESSMENT — REFRACTION_MANIFEST
OS_ADD: +2.50
OD_SPHERE: +0.25
OD_ADD: +2.50
OS_CYLINDER: -0.25
OD_VA1: 20/20-2
OD_VA2: 20/20-2
OS_VA2: 20/20-2
OS_AXIS: 120
OD_CYLINDER: -0.25
OD_AXIS: 150
OS_VA1: 20/20-2
OS_SPHERE: PLANO

## 2020-05-26 ASSESSMENT — REFRACTION_AUTOREFRACTION
OD_CYLINDER: -0.75
OS_CYLINDER: -0.50
OD_SPHERE: +0.50
OS_AXIS: 119
OS_SPHERE: +0.50
OD_AXIS: 153

## 2020-05-26 ASSESSMENT — REFRACTION_CURRENTRX
OS_CYLINDER: -0.75
OS_VPRISM_DIRECTION: PROGS
OS_VPRISM_DIRECTION: PROGS
OD_ADD: +2.50
OS_OVR_VA: 20/
OD_VPRISM_DIRECTION: PROGS
OS_ADD: +2.50
OD_SPHERE: +0.25
OS_ADD: +2.50
OD_ADD: +2.50
OD_AXIS: 150
OS_CYLINDER: -0.25
OS_AXIS: 120
OS_SPHERE: -1.75
OD_AXIS: 50
OS_AXIS: 158
OS_OVR_VA: 20/
OD_VPRISM_DIRECTION: PROGS
OD_OVR_VA: 20/
OD_CYLINDER: -0.50
OD_OVR_VA: 20/
OS_SPHERE: PLANO
OD_SPHERE: -1.00
OD_CYLINDER: -0.25

## 2020-05-26 ASSESSMENT — LID EXAM ASSESSMENTS
OS_TRICHIASIS: T
OS_BLEPHARITIS: ABSENT
OD_BLEPHARITIS: ABSENT
OS_MEIBOMITIS: 1+

## 2020-05-26 ASSESSMENT — CONFRONTATIONAL VISUAL FIELD TEST (CVF)
OS_FINDINGS: FULL
OD_FINDINGS: FULL

## 2020-05-26 ASSESSMENT — TEAR BREAK UP TIME (TBUT)
OD_TBUT: 2+ 3+
OS_TBUT: 2+ 3+

## 2020-05-26 ASSESSMENT — LID POSITION - PTOSIS
OS_PTOSIS: 1+
OD_PTOSIS: 1+ 2+

## 2020-05-26 ASSESSMENT — SUPERFICIAL PUNCTATE KERATITIS (SPK)
OS_SPK: T 1+
OD_SPK: T

## 2020-05-26 ASSESSMENT — SPHEQUIV_DERIVED
OD_SPHEQUIV: 0.125
OD_SPHEQUIV: 0.125
OS_SPHEQUIV: 0.25

## 2020-05-26 ASSESSMENT — VISUAL ACUITY
OS_BCVA: 20/20-2
OD_BCVA: 20/20-1

## 2020-05-26 ASSESSMENT — LID POSITION - ECTROPION: OS_ECTROPION: LLL T

## 2020-06-01 ENCOUNTER — DOCTOR'S OFFICE (OUTPATIENT)
Dept: URBAN - NONMETROPOLITAN AREA CLINIC 1 | Facility: CLINIC | Age: 65
Setting detail: OPHTHALMOLOGY
End: 2020-06-01
Payer: COMMERCIAL

## 2020-06-01 DIAGNOSIS — H43.12: ICD-10-CM

## 2020-06-01 DIAGNOSIS — D31.32: ICD-10-CM

## 2020-06-01 DIAGNOSIS — H35.3122: ICD-10-CM

## 2020-06-01 DIAGNOSIS — H43.813: ICD-10-CM

## 2020-06-01 DIAGNOSIS — H35.371: ICD-10-CM

## 2020-06-01 DIAGNOSIS — H35.3111: ICD-10-CM

## 2020-06-01 PROCEDURE — 92014 COMPRE OPH EXAM EST PT 1/>: CPT | Performed by: OPHTHALMOLOGY

## 2020-06-01 ASSESSMENT — REFRACTION_CURRENTRX
OD_VPRISM_DIRECTION: PROGS
OD_CYLINDER: -0.25
OD_AXIS: 50
OS_OVR_VA: 20/
OS_CYLINDER: -0.25
OS_OVR_VA: 20/
OD_ADD: +2.50
OS_ADD: +2.50
OD_OVR_VA: 20/
OS_AXIS: 158
OS_ADD: +2.50
OD_CYLINDER: -0.50
OD_AXIS: 150
OD_OVR_VA: 20/
OD_SPHERE: +0.25
OS_VPRISM_DIRECTION: PROGS
OD_ADD: +2.50
OS_CYLINDER: -0.75
OS_SPHERE: -1.75
OS_AXIS: 120
OS_SPHERE: PLANO
OS_VPRISM_DIRECTION: PROGS
OD_VPRISM_DIRECTION: PROGS
OD_SPHERE: -1.00

## 2020-06-01 ASSESSMENT — LID POSITION - PTOSIS
OD_PTOSIS: 1+ 2+
OS_PTOSIS: 1+

## 2020-06-01 ASSESSMENT — REFRACTION_AUTOREFRACTION
OD_CYLINDER: -0.75
OD_AXIS: 153
OS_SPHERE: +0.50
OS_AXIS: 119
OS_CYLINDER: -0.50
OD_SPHERE: +0.50

## 2020-06-01 ASSESSMENT — VISUAL ACUITY
OS_BCVA: 20/20-2
OD_BCVA: 20/20-1

## 2020-06-01 ASSESSMENT — SUPERFICIAL PUNCTATE KERATITIS (SPK)
OD_SPK: T
OS_SPK: T 1+

## 2020-06-01 ASSESSMENT — REFRACTION_MANIFEST
OD_VA1: 20/20-2
OS_VA1: 20/20-2
OS_CYLINDER: -0.25
OS_ADD: +2.50
OS_VA2: 20/20-2
OD_AXIS: 150
OD_ADD: +2.50
OS_AXIS: 120
OS_SPHERE: PLANO
OD_SPHERE: +0.25
OD_VA2: 20/20-2
OD_CYLINDER: -0.25

## 2020-06-01 ASSESSMENT — TEAR BREAK UP TIME (TBUT)
OD_TBUT: 2+ 3+
OS_TBUT: 2+ 3+

## 2020-06-01 ASSESSMENT — LID POSITION - ECTROPION: OS_ECTROPION: LLL T

## 2020-06-01 ASSESSMENT — SPHEQUIV_DERIVED
OS_SPHEQUIV: 0.25
OD_SPHEQUIV: 0.125
OD_SPHEQUIV: 0.125

## 2020-06-01 ASSESSMENT — LID EXAM ASSESSMENTS
OS_MEIBOMITIS: 1+
OS_BLEPHARITIS: ABSENT
OD_BLEPHARITIS: ABSENT
OS_TRICHIASIS: T

## 2020-06-01 ASSESSMENT — CONFRONTATIONAL VISUAL FIELD TEST (CVF)
OD_FINDINGS: FULL
OS_FINDINGS: FULL

## 2020-06-18 ENCOUNTER — DOCTOR'S OFFICE (OUTPATIENT)
Dept: URBAN - NONMETROPOLITAN AREA CLINIC 1 | Facility: CLINIC | Age: 65
Setting detail: OPHTHALMOLOGY
End: 2020-06-18
Payer: COMMERCIAL

## 2020-06-18 DIAGNOSIS — H35.3111: ICD-10-CM

## 2020-06-18 DIAGNOSIS — H04.122: ICD-10-CM

## 2020-06-18 DIAGNOSIS — H43.12: ICD-10-CM

## 2020-06-18 DIAGNOSIS — H35.3122: ICD-10-CM

## 2020-06-18 DIAGNOSIS — H04.123: ICD-10-CM

## 2020-06-18 DIAGNOSIS — D31.32: ICD-10-CM

## 2020-06-18 DIAGNOSIS — H43.813: ICD-10-CM

## 2020-06-18 DIAGNOSIS — H35.371: ICD-10-CM

## 2020-06-18 DIAGNOSIS — H04.121: ICD-10-CM

## 2020-06-18 PROCEDURE — 83861 MICROFLUID ANALY TEARS: CPT | Performed by: OPHTHALMOLOGY

## 2020-06-18 PROCEDURE — 92134 CPTRZ OPH DX IMG PST SGM RTA: CPT | Performed by: OPHTHALMOLOGY

## 2020-06-18 PROCEDURE — 92201 OPSCPY EXTND RTA DRAW UNI/BI: CPT | Performed by: OPHTHALMOLOGY

## 2020-06-18 PROCEDURE — 92014 COMPRE OPH EXAM EST PT 1/>: CPT | Performed by: OPHTHALMOLOGY

## 2020-06-18 ASSESSMENT — REFRACTION_CURRENTRX
OD_VPRISM_DIRECTION: PROGS
OD_VPRISM_DIRECTION: PROGS
OS_CYLINDER: -0.75
OD_SPHERE: -1.00
OS_AXIS: 158
OS_CYLINDER: -0.25
OS_OVR_VA: 20/
OS_ADD: +2.50
OD_AXIS: 50
OD_OVR_VA: 20/
OS_VPRISM_DIRECTION: PROGS
OS_SPHERE: -1.75
OS_OVR_VA: 20/
OS_ADD: +2.50
OD_ADD: +2.50
OD_ADD: +2.50
OD_CYLINDER: -0.25
OD_SPHERE: +0.25
OD_OVR_VA: 20/
OD_CYLINDER: -0.50
OS_SPHERE: PLANO
OD_AXIS: 150
OS_VPRISM_DIRECTION: PROGS
OS_AXIS: 120

## 2020-06-18 ASSESSMENT — TEAR BREAK UP TIME (TBUT)
OD_TBUT: 2+ 3+
OS_TBUT: 2+ 3+

## 2020-06-18 ASSESSMENT — REFRACTION_AUTOREFRACTION
OS_SPHERE: +0.50
OD_SPHERE: +0.50
OD_AXIS: 153
OS_AXIS: 119
OS_CYLINDER: -0.50
OD_CYLINDER: -0.75

## 2020-06-18 ASSESSMENT — LID POSITION - PTOSIS
OD_PTOSIS: 1+ 2+
OS_PTOSIS: 1+

## 2020-06-18 ASSESSMENT — REFRACTION_MANIFEST
OS_VA2: 20/20-2
OD_AXIS: 150
OD_CYLINDER: -0.25
OS_CYLINDER: -0.25
OS_SPHERE: PLANO
OS_VA1: 20/20-2
OS_AXIS: 120
OD_VA2: 20/20-2
OD_ADD: +2.50
OS_ADD: +2.50
OD_VA1: 20/20-2
OD_SPHERE: +0.25

## 2020-06-18 ASSESSMENT — SPHEQUIV_DERIVED
OD_SPHEQUIV: 0.125
OD_SPHEQUIV: 0.125
OS_SPHEQUIV: 0.25

## 2020-06-18 ASSESSMENT — LID EXAM ASSESSMENTS
OS_MEIBOMITIS: 1+
OD_BLEPHARITIS: ABSENT
OS_BLEPHARITIS: ABSENT
OS_TRICHIASIS: T

## 2020-06-18 ASSESSMENT — SUPERFICIAL PUNCTATE KERATITIS (SPK)
OS_SPK: T 1+
OD_SPK: T

## 2020-06-18 ASSESSMENT — CONFRONTATIONAL VISUAL FIELD TEST (CVF)
OD_FINDINGS: FULL
OS_FINDINGS: FULL

## 2020-06-18 ASSESSMENT — VISUAL ACUITY
OD_BCVA: 20/20-2
OS_BCVA: 20/20-1

## 2020-06-18 ASSESSMENT — LID POSITION - ECTROPION: OS_ECTROPION: LLL T

## 2020-06-29 ENCOUNTER — DOCTOR'S OFFICE (OUTPATIENT)
Dept: URBAN - NONMETROPOLITAN AREA CLINIC 1 | Facility: CLINIC | Age: 65
Setting detail: OPHTHALMOLOGY
End: 2020-06-29
Payer: COMMERCIAL

## 2020-06-29 DIAGNOSIS — H43.12: ICD-10-CM

## 2020-06-29 DIAGNOSIS — H04.122: ICD-10-CM

## 2020-06-29 DIAGNOSIS — D31.32: ICD-10-CM

## 2020-06-29 DIAGNOSIS — H04.121: ICD-10-CM

## 2020-06-29 DIAGNOSIS — H04.123: ICD-10-CM

## 2020-06-29 DIAGNOSIS — H35.3111: ICD-10-CM

## 2020-06-29 DIAGNOSIS — H43.813: ICD-10-CM

## 2020-06-29 DIAGNOSIS — H35.3122: ICD-10-CM

## 2020-06-29 DIAGNOSIS — H35.371: ICD-10-CM

## 2020-06-29 PROCEDURE — 92014 COMPRE OPH EXAM EST PT 1/>: CPT | Performed by: OPHTHALMOLOGY

## 2020-06-29 PROCEDURE — 92235 FLUORESCEIN ANGRPH MLTIFRAME: CPT | Performed by: OPHTHALMOLOGY

## 2020-06-29 PROCEDURE — 92250 FUNDUS PHOTOGRAPHY W/I&R: CPT | Performed by: OPHTHALMOLOGY

## 2020-06-29 ASSESSMENT — REFRACTION_CURRENTRX
OD_VPRISM_DIRECTION: PROGS
OD_ADD: +2.50
OS_VPRISM_DIRECTION: PROGS
OD_AXIS: 150
OS_SPHERE: -1.75
OS_AXIS: 158
OS_VPRISM_DIRECTION: PROGS
OD_OVR_VA: 20/
OS_ADD: +2.50
OS_OVR_VA: 20/
OS_SPHERE: PLANO
OD_VPRISM_DIRECTION: PROGS
OD_SPHERE: +0.25
OD_AXIS: 50
OD_ADD: +2.50
OS_AXIS: 120
OD_CYLINDER: -0.25
OS_CYLINDER: -0.75
OD_CYLINDER: -0.50
OD_OVR_VA: 20/
OD_SPHERE: -1.00
OS_CYLINDER: -0.25
OS_OVR_VA: 20/
OS_ADD: +2.50

## 2020-06-29 ASSESSMENT — LID EXAM ASSESSMENTS
OS_TRICHIASIS: T
OS_MEIBOMITIS: 1+
OD_BLEPHARITIS: ABSENT
OS_BLEPHARITIS: ABSENT

## 2020-06-29 ASSESSMENT — LID POSITION - PTOSIS
OS_PTOSIS: 1+
OD_PTOSIS: 1+ 2+

## 2020-06-29 ASSESSMENT — REFRACTION_AUTOREFRACTION
OS_SPHERE: +0.50
OD_SPHERE: +0.50
OD_CYLINDER: -0.75
OD_AXIS: 153
OS_AXIS: 119
OS_CYLINDER: -0.50

## 2020-06-29 ASSESSMENT — REFRACTION_MANIFEST
OS_AXIS: 120
OS_VA2: 20/20-2
OD_VA1: 20/20-2
OS_SPHERE: PLANO
OD_CYLINDER: -0.25
OD_AXIS: 150
OD_ADD: +2.50
OD_SPHERE: +0.25
OS_CYLINDER: -0.25
OD_VA2: 20/20-2
OS_ADD: +2.50
OS_VA1: 20/20-2

## 2020-06-29 ASSESSMENT — TEAR BREAK UP TIME (TBUT)
OS_TBUT: 2+ 3+
OD_TBUT: 2+ 3+

## 2020-06-29 ASSESSMENT — SPHEQUIV_DERIVED
OS_SPHEQUIV: 0.25
OD_SPHEQUIV: 0.125
OD_SPHEQUIV: 0.125

## 2020-06-29 ASSESSMENT — SUPERFICIAL PUNCTATE KERATITIS (SPK)
OS_SPK: T 1+
OD_SPK: T

## 2020-06-29 ASSESSMENT — VISUAL ACUITY
OD_BCVA: 20/20-1
OS_BCVA: 20/20-2

## 2020-06-29 ASSESSMENT — CONFRONTATIONAL VISUAL FIELD TEST (CVF)
OS_FINDINGS: FULL
OD_FINDINGS: FULL

## 2020-06-29 ASSESSMENT — LID POSITION - ECTROPION: OS_ECTROPION: LLL T

## 2020-07-31 ENCOUNTER — RX ONLY (RX ONLY)
Age: 65
End: 2020-07-31

## 2020-07-31 ENCOUNTER — DOCTOR'S OFFICE (OUTPATIENT)
Dept: URBAN - NONMETROPOLITAN AREA CLINIC 1 | Facility: CLINIC | Age: 65
Setting detail: OPHTHALMOLOGY
End: 2020-07-31
Payer: COMMERCIAL

## 2020-07-31 VITALS — HEIGHT: 55 IN

## 2020-07-31 DIAGNOSIS — H35.3111: ICD-10-CM

## 2020-07-31 DIAGNOSIS — H04.122: ICD-10-CM

## 2020-07-31 DIAGNOSIS — H02.105: ICD-10-CM

## 2020-07-31 DIAGNOSIS — H04.121: ICD-10-CM

## 2020-07-31 DIAGNOSIS — H04.123: ICD-10-CM

## 2020-07-31 DIAGNOSIS — H35.3122: ICD-10-CM

## 2020-07-31 DIAGNOSIS — H43.813: ICD-10-CM

## 2020-07-31 DIAGNOSIS — D31.32: ICD-10-CM

## 2020-07-31 DIAGNOSIS — H35.371: ICD-10-CM

## 2020-07-31 PROBLEM — H02.423 PTOSIS; BOTH EYES: Status: ACTIVE | Noted: 2020-07-31

## 2020-07-31 PROBLEM — H17.9 CORNEAL SCAR ; LEFT EYE: Status: ACTIVE | Noted: 2018-02-13

## 2020-07-31 PROBLEM — H02.423 PTOSIS MYOGENIC; BOTH EYES: Status: ACTIVE | Noted: 2017-02-07

## 2020-07-31 PROBLEM — H02.433 PTOSIS; BOTH EYES: Status: ACTIVE | Noted: 2020-07-31

## 2020-07-31 PROBLEM — H01.111 ALLERGIC DERMATITIS; RIGHT UPPER LID: Status: ACTIVE | Noted: 2020-01-27

## 2020-07-31 PROBLEM — H01.004 BLEPHARITIS; RIGHT UPPER LID, LEFT UPPER LID: Status: RESOLVED | Noted: 2017-02-07 | Resolved: 2020-07-31

## 2020-07-31 PROBLEM — H01.001 BLEPHARITIS; RIGHT UPPER LID, LEFT UPPER LID: Status: RESOLVED | Noted: 2017-02-07 | Resolved: 2020-07-31

## 2020-07-31 PROBLEM — H40.013 GLAUCOMA SUSPECT, LOW RISK; BOTH EYES: Status: ACTIVE | Noted: 2018-07-31

## 2020-07-31 PROBLEM — H52.223 ASTIGMATISM, REGULAR; BOTH EYES: Status: ACTIVE | Noted: 2020-01-27

## 2020-07-31 PROBLEM — H02.413 PTOSIS; BOTH EYES: Status: ACTIVE | Noted: 2020-07-31

## 2020-07-31 PROBLEM — H43.12 VITREOUS HEMORRHAGE; LEFT EYE: Status: RESOLVED | Noted: 2020-05-07 | Resolved: 2020-07-31

## 2020-07-31 PROBLEM — Z96.1: Status: ACTIVE | Noted: 2019-11-20

## 2020-07-31 PROBLEM — H02.055 TRICHIASIS WITHOUT ENTROPION; LEFT LOWER LID: Status: RESOLVED | Noted: 2019-11-25 | Resolved: 2020-07-31

## 2020-07-31 PROBLEM — H02.403 PTOSIS; BOTH EYES: Status: ACTIVE | Noted: 2020-07-31

## 2020-07-31 PROCEDURE — 92014 COMPRE OPH EXAM EST PT 1/>: CPT | Performed by: OPHTHALMOLOGY

## 2020-07-31 PROCEDURE — 83861 MICROFLUID ANALY TEARS: CPT | Performed by: OPHTHALMOLOGY

## 2020-07-31 PROCEDURE — 92134 CPTRZ OPH DX IMG PST SGM RTA: CPT | Performed by: OPHTHALMOLOGY

## 2020-07-31 PROCEDURE — 92201 OPSCPY EXTND RTA DRAW UNI/BI: CPT | Performed by: OPHTHALMOLOGY

## 2020-07-31 ASSESSMENT — REFRACTION_CURRENTRX
OS_AXIS: 158
OD_ADD: +2.50
OS_VPRISM_DIRECTION: PROGS
OS_ADD: +2.50
OD_AXIS: 150
OD_CYLINDER: -0.25
OS_OVR_VA: 20/
OS_SPHERE: PLANO
OS_AXIS: 120
OS_CYLINDER: -0.75
OS_OVR_VA: 20/
OD_SPHERE: +0.25
OD_OVR_VA: 20/
OS_SPHERE: -1.75
OD_SPHERE: -1.00
OS_VPRISM_DIRECTION: PROGS
OD_ADD: +2.50
OS_ADD: +2.50
OD_VPRISM_DIRECTION: PROGS
OD_AXIS: 50
OD_CYLINDER: -0.50
OD_OVR_VA: 20/
OS_CYLINDER: -0.25
OD_VPRISM_DIRECTION: PROGS

## 2020-07-31 ASSESSMENT — REFRACTION_AUTOREFRACTION
OS_CYLINDER: -0.50
OS_SPHERE: +0.50
OD_CYLINDER: -0.75
OS_AXIS: 119
OD_AXIS: 153
OD_SPHERE: +0.50

## 2020-07-31 ASSESSMENT — REFRACTION_MANIFEST
OS_SPHERE: PLANO
OD_ADD: +2.50
OS_CYLINDER: -0.25
OD_AXIS: 150
OS_AXIS: 120
OS_ADD: +2.50
OD_VA1: 20/20-2
OD_CYLINDER: -0.25
OD_VA2: 20/20-2
OS_VA2: 20/20-2
OD_SPHERE: +0.25
OS_VA1: 20/20-2

## 2020-07-31 ASSESSMENT — SPHEQUIV_DERIVED
OD_SPHEQUIV: 0.125
OS_SPHEQUIV: 0.25
OD_SPHEQUIV: 0.125

## 2020-07-31 ASSESSMENT — VISUAL ACUITY
OD_BCVA: 20/20-1
OS_BCVA: 20/25

## 2020-07-31 ASSESSMENT — LID POSITION - PTOSIS
OD_PTOSIS: 1+ 2+
OS_PTOSIS: 1+

## 2020-07-31 ASSESSMENT — LID EXAM ASSESSMENTS
OS_MEIBOMITIS: 1+
OS_TRICHIASIS: T
OD_BLEPHARITIS: ABSENT
OS_BLEPHARITIS: ABSENT

## 2020-07-31 ASSESSMENT — CONFRONTATIONAL VISUAL FIELD TEST (CVF)
OD_FINDINGS: FULL
OS_FINDINGS: FULL

## 2020-07-31 ASSESSMENT — TEAR BREAK UP TIME (TBUT)
OD_TBUT: 2+ 3+
OS_TBUT: 2+ 3+

## 2020-07-31 ASSESSMENT — SUPERFICIAL PUNCTATE KERATITIS (SPK)
OD_SPK: T
OS_SPK: T 1+

## 2020-07-31 ASSESSMENT — LID POSITION - ECTROPION: OS_ECTROPION: LLL T

## 2021-12-09 ENCOUNTER — APPOINTMENT (EMERGENCY)
Dept: RADIOLOGY | Facility: HOSPITAL | Age: 66
End: 2021-12-09
Payer: MEDICARE

## 2021-12-09 ENCOUNTER — HOSPITAL ENCOUNTER (EMERGENCY)
Facility: HOSPITAL | Age: 66
Discharge: HOME | End: 2021-12-09
Attending: EMERGENCY MEDICINE | Admitting: EMERGENCY MEDICINE
Payer: MEDICARE

## 2021-12-09 VITALS
TEMPERATURE: 97.6 F | OXYGEN SATURATION: 98 % | DIASTOLIC BLOOD PRESSURE: 74 MMHG | WEIGHT: 144.18 LBS | BODY MASS INDEX: 29.07 KG/M2 | RESPIRATION RATE: 18 BRPM | HEIGHT: 59 IN | HEART RATE: 74 BPM | SYSTOLIC BLOOD PRESSURE: 133 MMHG

## 2021-12-09 DIAGNOSIS — R07.89 ATYPICAL CHEST PAIN: Primary | ICD-10-CM

## 2021-12-09 LAB
ALBUMIN SERPL BCP-MCNC: 3.9 G/DL (ref 3.5–5)
ALP SERPL-CCNC: 83 U/L (ref 46–116)
ALT SERPL W P-5'-P-CCNC: 29 U/L (ref 12–78)
ANION GAP SERPL CALCULATED.3IONS-SCNC: 8 MMOL/L (ref 4–13)
AST SERPL W P-5'-P-CCNC: 14 U/L (ref 5–45)
ATRIAL RATE: 75 BPM
BASOPHILS # BLD AUTO: 0.05 THOUSANDS/ΜL (ref 0–0.1)
BASOPHILS NFR BLD AUTO: 1 % (ref 0–1)
BILIRUB SERPL-MCNC: 0.47 MG/DL (ref 0.2–1)
BUN SERPL-MCNC: 13 MG/DL (ref 5–25)
CALCIUM SERPL-MCNC: 8.9 MG/DL (ref 8.3–10.1)
CARDIAC TROPONIN I PNL SERPL HS: <2 NG/L
CHLORIDE SERPL-SCNC: 105 MMOL/L (ref 100–108)
CO2 SERPL-SCNC: 29 MMOL/L (ref 21–32)
CREAT SERPL-MCNC: 0.74 MG/DL (ref 0.6–1.3)
EOSINOPHIL # BLD AUTO: 0.05 THOUSAND/ΜL (ref 0–0.61)
EOSINOPHIL NFR BLD AUTO: 1 % (ref 0–6)
ERYTHROCYTE [DISTWIDTH] IN BLOOD BY AUTOMATED COUNT: 12.4 % (ref 11.6–15.1)
GFR SERPL CREATININE-BSD FRML MDRD: 85 ML/MIN/1.73SQ M
GLUCOSE SERPL-MCNC: 106 MG/DL (ref 65–140)
HCT VFR BLD AUTO: 41.5 % (ref 34.8–46.1)
HGB BLD-MCNC: 13.6 G/DL (ref 11.5–15.4)
IMM GRANULOCYTES # BLD AUTO: 0 THOUSAND/UL (ref 0–0.2)
IMM GRANULOCYTES NFR BLD AUTO: 0 % (ref 0–2)
LYMPHOCYTES # BLD AUTO: 1.4 THOUSANDS/ΜL (ref 0.6–4.47)
LYMPHOCYTES NFR BLD AUTO: 25 % (ref 14–44)
MCH RBC QN AUTO: 28.8 PG (ref 26.8–34.3)
MCHC RBC AUTO-ENTMCNC: 32.8 G/DL (ref 31.4–37.4)
MCV RBC AUTO: 88 FL (ref 82–98)
MONOCYTES # BLD AUTO: 0.49 THOUSAND/ΜL (ref 0.17–1.22)
MONOCYTES NFR BLD AUTO: 9 % (ref 4–12)
NEUTROPHILS # BLD AUTO: 3.6 THOUSANDS/ΜL (ref 1.85–7.62)
NEUTS SEG NFR BLD AUTO: 64 % (ref 43–75)
NRBC BLD AUTO-RTO: 0 /100 WBCS
P AXIS: 53 DEGREES
PLATELET # BLD AUTO: 314 THOUSANDS/UL (ref 149–390)
PMV BLD AUTO: 9.7 FL (ref 8.9–12.7)
POTASSIUM SERPL-SCNC: 4.3 MMOL/L (ref 3.5–5.3)
PR INTERVAL: 142 MS
PROT SERPL-MCNC: 7.3 G/DL (ref 6.4–8.2)
QRS AXIS: 22 DEGREES
QRSD INTERVAL: 78 MS
QT INTERVAL: 408 MS
QTC INTERVAL: 455 MS
RBC # BLD AUTO: 4.72 MILLION/UL (ref 3.81–5.12)
SODIUM SERPL-SCNC: 142 MMOL/L (ref 136–145)
T WAVE AXIS: 41 DEGREES
VENTRICULAR RATE: 75 BPM
WBC # BLD AUTO: 5.59 THOUSAND/UL (ref 4.31–10.16)

## 2021-12-09 PROCEDURE — U0003 INFECTIOUS AGENT DETECTION BY NUCLEIC ACID (DNA OR RNA); SEVERE ACUTE RESPIRATORY SYNDROME CORONAVIRUS 2 (SARS-COV-2) (CORONAVIRUS DISEASE [COVID-19]), AMPLIFIED PROBE TECHNIQUE, MAKING USE OF HIGH THROUGHPUT TECHNOLOGIES AS DESCRIBED BY CMS-2020-01-R: HCPCS | Performed by: PHYSICIAN ASSISTANT

## 2021-12-09 PROCEDURE — 71045 X-RAY EXAM CHEST 1 VIEW: CPT

## 2021-12-09 PROCEDURE — U0005 INFEC AGEN DETEC AMPLI PROBE: HCPCS | Performed by: PHYSICIAN ASSISTANT

## 2021-12-09 PROCEDURE — 99285 EMERGENCY DEPT VISIT HI MDM: CPT | Performed by: PHYSICIAN ASSISTANT

## 2021-12-09 PROCEDURE — 99285 EMERGENCY DEPT VISIT HI MDM: CPT

## 2021-12-09 PROCEDURE — 93005 ELECTROCARDIOGRAM TRACING: CPT

## 2021-12-09 PROCEDURE — 85025 COMPLETE CBC W/AUTO DIFF WBC: CPT | Performed by: PHYSICIAN ASSISTANT

## 2021-12-09 PROCEDURE — 36415 COLL VENOUS BLD VENIPUNCTURE: CPT | Performed by: PHYSICIAN ASSISTANT

## 2021-12-09 PROCEDURE — 93010 ELECTROCARDIOGRAM REPORT: CPT | Performed by: INTERNAL MEDICINE

## 2021-12-09 PROCEDURE — 84484 ASSAY OF TROPONIN QUANT: CPT | Performed by: PHYSICIAN ASSISTANT

## 2021-12-09 PROCEDURE — 80053 COMPREHEN METABOLIC PANEL: CPT | Performed by: PHYSICIAN ASSISTANT

## 2021-12-10 LAB — SARS-COV-2 RNA RESP QL NAA+PROBE: NEGATIVE

## 2025-05-19 ENCOUNTER — EVALUATION (OUTPATIENT)
Dept: PHYSICAL THERAPY | Facility: CLINIC | Age: 70
End: 2025-05-19
Payer: MEDICARE

## 2025-05-19 DIAGNOSIS — M54.41 ACUTE BACK PAIN WITH SCIATICA, RIGHT: Primary | ICD-10-CM

## 2025-05-19 PROCEDURE — 97161 PT EVAL LOW COMPLEX 20 MIN: CPT | Performed by: PHYSICAL THERAPIST

## 2025-05-19 PROCEDURE — 97535 SELF CARE MNGMENT TRAINING: CPT | Performed by: PHYSICAL THERAPIST

## 2025-05-19 PROCEDURE — 97110 THERAPEUTIC EXERCISES: CPT | Performed by: PHYSICAL THERAPIST

## 2025-05-19 NOTE — PROGRESS NOTES
PT Evaluation     Today's date: 2025  Patient name: Tashia Duffy  : 1955  MRN: [id number]  Referring provider: Sondra Cuellar DO  Dx:   Encounter Diagnosis     ICD-10-CM    1. Acute back pain with sciatica, right  M54.41           Start Time: 0815  Stop Time: 0915  Total time in clinic (min): 60 minutes    Assessment  Impairments: abnormal or restricted ROM, impaired physical strength, lacks appropriate home exercise program and pain with function    Assessment details: The patient is a 71 yo female who presents to physical therapy with signs and symptoms consistent with degenerative changes in the lumbar spine and SI joint. She has a long history of sciatica. Her recent pain started 2 1/2 weeks ago after several days of heavy yard work. She started a course of prednisone which is helping with the pain. She is having difficulty sleeping due to pain and negotiates stairs slowly. Examination reveals deficits in R hip flexibility, lumbar mobility and core stability and B hip strength.   No SIJ asymmetry noted. She would benefit from a course of skilled physical therapy to address deficits in ROM, strength, stability and functional status.      Understanding of Dx/Px/POC: good     Prognosis: good    Goals  STG(4 weeks):            1. Independent with HEP            2. Decrease pain to 4/10 at worst with functional activities            3. Increase AROM L/S WFL with pain <3-4/10            4. Increase strength by 1/2 MMT grade     LTG(8 weeks):              1. Eliminate pain with functional activities and with sleep             2. Increase BLE strength to 5/5             3. Improve R hip flexibility to Good             4. Return to PLOF       Plan  Patient would benefit from: skilled physical therapy  Planned modality interventions: cryotherapy and thermotherapy: hydrocollator packs    Planned therapy interventions: abdominal trunk stabilization, manual therapy, neuromuscular re-education,  strengthening, stretching, therapeutic activities, therapeutic exercise and home exercise program    Frequency: 2x week  Duration in weeks: 8  Plan of Care beginning date: 2025  Plan of Care expiration date: 2025  Treatment plan discussed with: patient        Subjective Evaluation    History of Present Illness  Mechanism of injury: The patient reports intermittent sciatica  ove rthe past 20 years. She experienced a recent flare 2 1/2 weeks ago. She was doing heavy outside work for several days. She experienced pain in the R lumbar area that extended down the lateral R thigh and calf and lateral border of the R foot. She was prescribed prednisone for a 5 day course which has helped with the pain. She was diagnosed with ankylosing spondylitis 20 years ago and sees a rheumatologist. She notes most pain during the night when trying to sleep.           Recurrent probem    Quality of life: good    Patient Goals  Patient goal: I'd like to sleep better  Pain  Current pain rating: 3  At best pain rating: 3  At worst pain ratin  Location: R LB, lateral thigh  Quality: dull ache and burning  Relieving factors: heat, medications and ice  Exacerbated by: turning to the L.    Social Support  Steps to enter house: yes  Stairs in house: yes (has 1st floor bedroom)   Lives in: multiple-level home  Lives with: alone    Employment status: not working        Objective     Concurrent Complaints  Positive for disturbed sleep. Negative for bladder dysfunction, bowel dysfunction and saddle (S4) numbness    Palpation     Right   Tenderness of the lumbar paraspinals.     Tenderness     Additional Tenderness Details  R SIJ    Neurological Testing     Sensation     Lumbar   Left   Intact: light touch    Right   Intact: light touch    Reflexes   Left   Patellar (L4): normal (2+)    Right   Patellar (L4): normal (2+)    Active Range of Motion     Lumbar   Flexion:  WFL  Extension:  with pain Restriction level: minimal  Left lateral  "flexion:  Restriction level: moderate  Right lateral flexion:  Restriction level: moderate  Left rotation:  with pain Restriction level: minimal  Right rotation:  Restriction level: minimal    Strength/Myotome Testing     Left Hip   Planes of Motion   Flexion: 4+  Extension: 3+  Abduction: 4    Right Hip   Planes of Motion   Flexion: 4+  Extension: 3+  Abduction: 4    Left Knee   Flexion: 4+  Extension: 5    Right Knee   Flexion: 4+  Extension: 5    Left Ankle/Foot   Dorsiflexion: 5  Plantar flexion: 4+    Right Ankle/Foot   Dorsiflexion: 5  Plantar flexion: 4+    Tests     Lumbar     Left   Negative passive SLR.     Right   Negative passive SLR.     Left Hip   Negative BANG.     Right Hip   Negative BANG and long sit.     Additional Tests Details  Flexibility  Hamstring Good bilaterally                   Piriformis  R Fair,   L Good                   Quad  R Poor, L Fair    Ambulation     Ambulation: Stairs   Ascend stairs: independent (slowly)  Pattern: reciprocal  Railings: one rail  Descend stairs: independent (slowly)  Pattern: reciprocal  Railings: one rail             Precautions: Ankylosis spondylitis  HEP issued. Diagnosis, prognosis and PT POC discussed with patient       5/19            Manuals                                                    Neuro Re-Ed             PPT 5\"x10            PPT w/ march             PPT w/ SLR                          PB squat                          Ther Ex             Prone quad stretch-BLE JM            LTR 10\"x5            Piri stretch(fig 4) 20\"x3 ea BLE            Bridge             Clam             Iso hip add             Standing hip abd,ext                                                    NuStep for hip mobility and ms endurance             Ther Activity             Step ups F/L             Side stepping                          Modalities             MH 10' R LB/hip in sitting post exer                                "

## 2025-05-19 NOTE — HOME EXERCISE EDUCATION
Program_ID:[id number]   Access Code: [email or web address]  URL: [email or web address]  Date: 05-  Prepared By: Mounika Leigh    Program Notes      Exercises      - Supine Lower Trunk Rotation - 1 x daily -  x weekly - 1 sets - 5-10 reps - 10 second hold      - Supine Posterior Pelvic Tilt - 1 x daily -  x weekly - 2 sets - 10 reps - 5 second hold      - Supine Piriformis Stretch with Foot on Ground - 1 x daily -  x weekly - 1 sets - 3 reps - 20 second hold

## 2025-05-19 NOTE — LETTER
May 19, 2025    Sondra Cuellar DO  [address] [facility] [address]    Patient: Tashia Duffy   YOB: 1955   Date of Visit: 2025     Encounter Diagnosis     ICD-10-CM    1. Acute back pain with sciatica, right  M54.41           Dear Dr. Sondra Cuellar DO:    Thank you for your recent referral of Tashia Duffy. Please review the attached evaluation summary from Tashia's recent visit.     Please verify that you agree with the plan of care by signing the attached order.     If you have any questions or concerns, please do not hesitate to call.     I sincerely appreciate the opportunity to share in the care of one of your patients and hope to have another opportunity to work with you in the near future.       Sincerely,    Mounika Leigh, PT      Referring Provider:      I certify that I have read the below Plan of Care and certify the need for these services furnished under this plan of treatment while under my care.                    Sondra Cuellar DO  [address] [facility] [address]  Via Fax: [phone]          PT Evaluation     Today's date: 2025  Patient name: Tashia Duffy  : 1955  MRN: [id number]  Referring provider: Sondra Cuellar DO  Dx:   Encounter Diagnosis     ICD-10-CM    1. Acute back pain with sciatica, right  M54.41           Start Time: 0815  Stop Time: 15  Total time in clinic (min): 60 minutes    Assessment  Impairments: abnormal or restricted ROM, impaired physical strength, lacks appropriate home exercise program and pain with function    Assessment details: The patient is a 69 yo female who presents to physical therapy with signs and symptoms consistent with degenerative changes in the lumbar spine and SI joint. She has a long history of sciatica. Her recent pain started 2 1/2 weeks ago after several days of heavy yard work. She started a course of prednisone which is helping with the pain. She is having difficulty sleeping  due to pain and negotiates stairs slowly. Examination reveals deficits in R hip flexibility, lumbar mobility and core stability and B hip strength.   No SIJ asymmetry noted. She would benefit from a course of skilled physical therapy to address deficits in ROM, strength, stability and functional status.      Understanding of Dx/Px/POC: good     Prognosis: good    Goals  STG(4 weeks):            1. Independent with HEP            2. Decrease pain to 4/10 at worst with functional activities            3. Increase AROM L/S WFL with pain <3-4/10            4. Increase strength by 1/2 MMT grade     LTG(8 weeks):              1. Eliminate pain with functional activities and with sleep             2. Increase BLE strength to 5/5             3. Improve R hip flexibility to Good             4. Return to PLOF       Plan  Patient would benefit from: skilled physical therapy  Planned modality interventions: cryotherapy and thermotherapy: hydrocollator packs    Planned therapy interventions: abdominal trunk stabilization, manual therapy, neuromuscular re-education, strengthening, stretching, therapeutic activities, therapeutic exercise and home exercise program    Frequency: 2x week  Duration in weeks: 8  Plan of Care beginning date: 5/19/2025  Plan of Care expiration date: 7/14/2025  Treatment plan discussed with: patient        Subjective Evaluation    History of Present Illness  Mechanism of injury: The patient reports intermittent sciatica  ove rthe past 20 years. She experienced a recent flare 2 1/2 weeks ago. She was doing heavy outside work for several days. She experienced pain in the R lumbar area that extended down the lateral R thigh and calf and lateral border of the R foot. She was prescribed prednisone for a 5 day course which has helped with the pain. She was diagnosed with ankylosing spondylitis 20 years ago and sees a rheumatologist. She notes most pain during the night when trying to sleep.           Recurrent  probem    Quality of life: good    Patient Goals  Patient goal: I'd like to sleep better  Pain  Current pain rating: 3  At best pain rating: 3  At worst pain ratin  Location: R LB, lateral thigh  Quality: dull ache and burning  Relieving factors: heat, medications and ice  Exacerbated by: turning to the L.    Social Support  Steps to enter house: yes  Stairs in house: yes (has 1st floor bedroom)   Lives in: multiple-level home  Lives with: alone    Employment status: not working        Objective     Concurrent Complaints  Positive for disturbed sleep. Negative for bladder dysfunction, bowel dysfunction and saddle (S4) numbness    Palpation     Right   Tenderness of the lumbar paraspinals.     Tenderness     Additional Tenderness Details  R SIJ    Neurological Testing     Sensation     Lumbar   Left   Intact: light touch    Right   Intact: light touch    Reflexes   Left   Patellar (L4): normal (2+)    Right   Patellar (L4): normal (2+)    Active Range of Motion     Lumbar   Flexion:  WFL  Extension:  with pain Restriction level: minimal  Left lateral flexion:  Restriction level: moderate  Right lateral flexion:  Restriction level: moderate  Left rotation:  with pain Restriction level: minimal  Right rotation:  Restriction level: minimal    Strength/Myotome Testing     Left Hip   Planes of Motion   Flexion: 4+  Extension: 3+  Abduction: 4    Right Hip   Planes of Motion   Flexion: 4+  Extension: 3+  Abduction: 4    Left Knee   Flexion: 4+  Extension: 5    Right Knee   Flexion: 4+  Extension: 5    Left Ankle/Foot   Dorsiflexion: 5  Plantar flexion: 4+    Right Ankle/Foot   Dorsiflexion: 5  Plantar flexion: 4+    Tests     Lumbar     Left   Negative passive SLR.     Right   Negative passive SLR.     Left Hip   Negative BANG.     Right Hip   Negative BANG and long sit.     Additional Tests Details  Flexibility  Hamstring Good bilaterally                   Piriformis  R Fair,   L Good                   Quad  R Poor,  "L Fair    Ambulation     Ambulation: Stairs   Ascend stairs: independent (slowly)  Pattern: reciprocal  Railings: one rail  Descend stairs: independent (slowly)  Pattern: reciprocal  Railings: one rail             Precautions: Ankylosis spondylitis  HEP issued. Diagnosis, prognosis and PT POC discussed with patient       5/19            Manuals                                                    Neuro Re-Ed             PPT 5\"x10            PPT w/ march             PPT w/ SLR                          PB squat                          Ther Ex             Prone quad stretch-BLE JM            LTR 10\"x5            Piri stretch(fig 4) 20\"x3 ea BLE            Bridge             Clam             Iso hip add             Standing hip abd,ext                                                    NuStep for hip mobility and ms endurance             Ther Activity             Step ups F/L             Side stepping                          Modalities             MH 10' R LB/hip in sitting post exer                                              "

## 2025-05-21 ENCOUNTER — OFFICE VISIT (OUTPATIENT)
Dept: PHYSICAL THERAPY | Facility: CLINIC | Age: 70
End: 2025-05-21
Attending: FAMILY MEDICINE
Payer: MEDICARE

## 2025-05-21 DIAGNOSIS — M54.41 ACUTE BACK PAIN WITH SCIATICA, RIGHT: Primary | ICD-10-CM

## 2025-05-21 PROCEDURE — 97112 NEUROMUSCULAR REEDUCATION: CPT

## 2025-05-21 PROCEDURE — 97110 THERAPEUTIC EXERCISES: CPT

## 2025-05-21 NOTE — PROGRESS NOTES
"Daily Note     Today's date: 2025  Patient name: Tashia Duffy  : 1955  MRN: [id number]  Referring provider: Sondra Cuellar DO  Dx:   Encounter Diagnosis     ICD-10-CM    1. Acute back pain with sciatica, right  M54.41                      Subjective: Patient reports that her LBP has decreased since she started taking the Prednisone. She is done the dose on Friday. She feels weakness in her R hip area. When she ambulates for long periods of time, she feels her R hip \"pops out of place\" and to return the distance home she has to hold where she has pain.      Objective: See treatment diary below.      Assessment: Tolerated treatment well. Patient would benefit from continued PT to improve core/upper trunk and B hip strength to improve lower back stability to perform functional activities for longer periods of time. She had some discomfort on Nustep, but tolerated with stretching into trunk flexion. She was able to tolerate core and inner abdominal stability TE. Her R quad presents with ROM restrictions where she can obtain approx 90* R knee flexion. Progress to tolerance.       Plan: Continue per plan of care.      Precautions: Ankylosis spondylitis, B knee surgeries   HEP issued. Diagnosis, prognosis and PT POC discussed with patient              Manuals                                    Neuro Re-Ed         PPT 5\"x10 5\"x20       PPT w/ x ea       PPT w/ SLR                  PB squat                  Ther Ex         Prone quad stretch-BLE JM CM       LTR 10\"x5 10\"x10       Piri stretch(fig 4) 20\"x3 ea BLE 20\"x3 ea BLE       Bridge  5\"x10       Clam  In SL 2x10       Iso hip add  5\"x20       Standing hip abd,ext  NV                                  NuStep for hip mobility and ms endurance  L4 10'       Ther Activity         Step ups F/L  NV       Side stepping  NV                Modalities         MH 10' R LB/hip in sitting post exer 10' R LB/hip in sitting post exer              "

## 2025-05-27 ENCOUNTER — OFFICE VISIT (OUTPATIENT)
Dept: PHYSICAL THERAPY | Facility: CLINIC | Age: 70
End: 2025-05-27
Attending: FAMILY MEDICINE
Payer: MEDICARE

## 2025-05-27 DIAGNOSIS — M54.41 ACUTE BACK PAIN WITH SCIATICA, RIGHT: Primary | ICD-10-CM

## 2025-05-27 PROCEDURE — 97112 NEUROMUSCULAR REEDUCATION: CPT

## 2025-05-27 PROCEDURE — 97110 THERAPEUTIC EXERCISES: CPT

## 2025-05-27 PROCEDURE — 97530 THERAPEUTIC ACTIVITIES: CPT

## 2025-05-27 NOTE — PROGRESS NOTES
"Daily Note     Today's date: 2025  Patient name: Tashia Duffy  : 1955  MRN: [id number]  Referring provider: Sondra Cuellar DO  Dx:   Encounter Diagnosis     ICD-10-CM    1. Acute back pain with sciatica, right  M54.41                      Subjective: Patient reports that she was doing yard work the last two days which caused her LBP to increase.       Objective: See treatment diary below. Incorporated standing TE today.      Assessment: Tolerated treatment well. Patient would benefit from continued PT to improve core/upper trunk and B hip strength to improve lower back stability to perform functional activities for longer periods of time. She was able to tolerate core and inner abdominal stability TE and progression today. Her R quad presents with more ROM restrictions than her L quad at this time.  Progress to tolerance.       Plan: Continue per plan of care.      Precautions: Ankylosis spondylitis, B knee surgeries   HEP issued. Diagnosis, prognosis and PT POC discussed with patient           Manuals                                    Neuro Re-Ed         PPT 5\"x10 5\"x20 5\"x20      PPT w/ x ea 2x10 ea      PPT w/ SLR                  PB squat                  Ther Ex         Prone quad stretch-BLE JM CM CM      LTR 10\"x5 10\"x10 10\"x10      Piri stretch(fig 4) 20\"x3 ea BLE 20\"x3 ea BLE 20\"x3 ea BLE      Bridge  5\"x10 5\"x10      Clam  In SL 2x10 In SL 2x10      Iso hip add  5\"x20 5\"x20      Standing hip abd,ext  NV BLE 2x10      HS stretch   8\" step 30\"x3                        NuStep for hip mobility and ms endurance  L4 10' L4 10'      Ther Activity         Step ups F/L  NV 6\" 10xea      Side stepping  NV 5x// bars               Modalities         MH 10' R LB/hip in sitting post exer 10' R LB/hip in sitting post exer 10' R LB/hip in sitting post exer                             "

## 2025-05-29 ENCOUNTER — OFFICE VISIT (OUTPATIENT)
Dept: PHYSICAL THERAPY | Facility: CLINIC | Age: 70
End: 2025-05-29
Attending: FAMILY MEDICINE
Payer: MEDICARE

## 2025-05-29 DIAGNOSIS — M54.41 ACUTE BACK PAIN WITH SCIATICA, RIGHT: Primary | ICD-10-CM

## 2025-05-29 PROCEDURE — 97112 NEUROMUSCULAR REEDUCATION: CPT

## 2025-05-29 PROCEDURE — 97110 THERAPEUTIC EXERCISES: CPT

## 2025-05-29 PROCEDURE — 97530 THERAPEUTIC ACTIVITIES: CPT

## 2025-05-29 NOTE — PROGRESS NOTES
"Daily Note     Today's date: 2025  Patient name: Tashia Duffy  : 1955  MRN: [id number]  Referring provider: Sondra Cuellar DO  Dx:   Encounter Diagnosis     ICD-10-CM    1. Acute back pain with sciatica, right  M54.41                      Subjective: Patient complains of pain in her er back as she left from the therapy last visit. She reported heaviness in her RLE while she was in a unilateral position. Patient reports she experienced restriction across lower mid back during bridging until rep 3, then it \"loosened\" up.       Objective: See treatment diary below.      Assessment: Tolerated treatment well. Patient has gluteus medius weakness in her right leg while in a unilateral stance position. Stretching exercises were on hold secondary to pain after the previous session to see if this was the cause. Patient would benefit from continued PT to increase ROM, strength and stability to improve functional limitations to return to prior level and perform functional activities for longer periods of time.      Plan: Continue per plan of care.      Precautions: Ankylosis spondylitis, B knee surgeries   HEP issued. Diagnosis, prognosis and PT POC discussed with patient          Manuals                                    Neuro Re-Ed         PPT 5\"x10 5\"x20 5\"x20 5\"x20     PPT / x ea 2x10 ea 2x10 ea     PPT w/ SLR                  PB squat                  Ther Ex         Prone quad stretch-BLE JM CM CM Hold      LTR 10\"x5 10\"x10 10\"x10 10\"x10     Piri stretch(fig 4) 20\"x3 ea BLE 20\"x3 ea BLE 20\"x3 ea BLE 20\"x3 ea BLE     Bridge  5\"x10 5\"x10 5\"x10     Clam  In SL 2x10 In SL 2x10 In SL 2x10 ea     Iso hip add  5\"x20 5\"x20 5\"x20     Standing hip abd,ext  NV BLE 2x10 BLE 2x10     HS stretch   8\" step 30\"x3 Hold                       NuStep for hip mobility and ms endurance  L4 10' L4 10' L4 10'     Ther Activity         Step ups F/L  NV 6\" 10xea 6\" 10x ea     Side stepping  NV " 5x// bars 5x// bars              Modalities         MH 10' R LB/hip in sitting post exer 10' R LB/hip in sitting post exer 10' R LB/hip in sitting post exer 10'R LB/hip  In sitting post exer

## 2025-06-03 ENCOUNTER — OFFICE VISIT (OUTPATIENT)
Dept: PHYSICAL THERAPY | Facility: CLINIC | Age: 70
End: 2025-06-03
Attending: FAMILY MEDICINE
Payer: MEDICARE

## 2025-06-03 DIAGNOSIS — M54.41 ACUTE BACK PAIN WITH SCIATICA, RIGHT: Primary | ICD-10-CM

## 2025-06-03 PROCEDURE — 97110 THERAPEUTIC EXERCISES: CPT

## 2025-06-03 PROCEDURE — 97530 THERAPEUTIC ACTIVITIES: CPT

## 2025-06-03 PROCEDURE — 97112 NEUROMUSCULAR REEDUCATION: CPT

## 2025-06-03 NOTE — PROGRESS NOTES
"Daily Note     Today's date: 6/3/2025  Patient name: Tashia Duffy  : 1955  MRN: [id number]  Referring provider: Sondra Cuellar DO  Dx:   Encounter Diagnosis     ICD-10-CM    1. Acute back pain with sciatica, right  M54.41                      Subjective: Patient reports that she \"over did it\" yesterday, she was outside doing yard work. She felt her pain wasn't present as much when she didn't perform the HS stretch and quad stretch. She is descending steps better but going up steps is still a challenge.      Objective: See treatment diary below. Progressed program to improve strength.      Assessment: Tolerated treatment well. Patient would benefit from continued PT to increase ROM, strength and stability to improve functional limitations to return to prior level and perform functional activities for longer periods of time. She tolerated progression today. She has B hip and core weakness at this time. Progress to tolerance.       Plan: Continue per plan of care.      Precautions: Ankylosis spondylitis, B knee surgeries   HEP issued. Diagnosis, prognosis and PT POC discussed with patient     5/19 5/21 5/27 5/29 6/3    Manuals                                    Neuro Re-Ed         PPT 5\"x10 5\"x20 5\"x20 5\"x20 5\"x20    PPT w/ march  20x ea 2x10 ea 2x10 ea 2x10 ea    PPT w/ SLR     10x ea    Tandem Stance     On firm 30\"x2    PB squat                  Ther Ex         Prone quad stretch-BLE JM CM CM Hold      LTR 10\"x5 10\"x10 10\"x10 10\"x10 10\"x10    Piri stretch(fig 4) 20\"x3 ea BLE 20\"x3 ea BLE 20\"x3 ea BLE 20\"x3 ea BLE 20\"x3 ea BLE    Bridge  5\"x10 5\"x10 5\"x10 5\"x20    Clam  In SL 2x10 In SL 2x10 In SL 2x10 ea In SL L2 2x10    Iso hip add  5\"x20 5\"x20 5\"x20 5\"x20    Standing hip abd,ext  NV BLE 2x10 BLE 2x10 BLE 3x10    HS stretch   8\" step 30\"x3 Hold                       NuStep for hip mobility and ms endurance  L4 10' L4 10' L4 10' L5 10'    Ther Activity         Step ups F/L  NV 6\" 10xea 6\" 10x ea 6\" " 2x10    Side stepping  NV 5x// bars 5x// bars L2 5x// bars             Modalities         MH 10' R LB/hip in sitting post exer 10' R LB/hip in sitting post exer 10' R LB/hip in sitting post exer 10' R LB/hip  In sitting post exer declined

## 2025-06-05 ENCOUNTER — OFFICE VISIT (OUTPATIENT)
Dept: PHYSICAL THERAPY | Facility: CLINIC | Age: 70
End: 2025-06-05
Attending: FAMILY MEDICINE
Payer: MEDICARE

## 2025-06-05 DIAGNOSIS — M54.41 ACUTE BACK PAIN WITH SCIATICA, RIGHT: Primary | ICD-10-CM

## 2025-06-05 PROCEDURE — 97110 THERAPEUTIC EXERCISES: CPT | Performed by: PHYSICAL THERAPIST

## 2025-06-05 PROCEDURE — 97530 THERAPEUTIC ACTIVITIES: CPT | Performed by: PHYSICAL THERAPIST

## 2025-06-05 PROCEDURE — 97112 NEUROMUSCULAR REEDUCATION: CPT | Performed by: PHYSICAL THERAPIST

## 2025-06-05 NOTE — PROGRESS NOTES
"Daily Note     Today's date: 2025  Patient name: Tashia Duffy  : 1955  MRN: [id number]  Referring provider: Sondra Cuellar DO  Dx:   Encounter Diagnosis     ICD-10-CM    1. Acute back pain with sciatica, right  M54.41           Start Time: 1515  Stop Time: 1600  Total time in clinic (min): 45 minutes    Subjective: The patient reports 1.5-2/10 pain in the R LB and lateral hip.      Objective: See treatment diary below      Assessment: 2# ankle weights added today to improve strength and stability.  Patient notes appropriate muscle fatigue in the RLE after exercises. Tolerated treatment well. Patient would benefit from continued PT      Plan: Continue per plan of care.      Precautions: Ankylosis spondylitis, B knee surgeries   HEP issued. Diagnosis, prognosis and PT POC discussed with patient     5/19 5/21 5/27 5/29 6/3 6/5   Manuals                                    Neuro Re-Ed         PPT 5\"x10 5\"x20 5\"x20 5\"x20 5\"x20 5\"x20   PPT w/ x ea 2x10 ea 2x10 ea 2x10 ea 2x10 ea   PPT w/ SLR     10x ea 2x10 ea   Tandem Stance     On firm 30\"x2 On firm 30\"x2   PB squat                  Ther Ex         Prone quad stretch-BLE JM CM CM Hold      LTR 10\"x5 10\"x10 10\"x10 10\"x10 10\"x10 10\"x10   Piri stretch(fig 4) 20\"x3 ea BLE 20\"x3 ea BLE 20\"x3 ea BLE 20\"x3 ea BLE 20\"x3 ea BLE 20\"x3 ea BLE   Bridge  5\"x10 5\"x10 5\"x10 5\"x20 5\"x20   Clam  In SL 2x10 In SL 2x10 In SL 2x10 ea In SL L2 2x10 On S/L L2 2x10   Iso hip add  5\"x20 5\"x20 5\"x20 5\"x20 5\"x20   Standing hip abd,ext  NV BLE 2x10 BLE 2x10 BLE 3x10 2# 2x10   HS stretch   8\" step 30\"x3 Hold                       NuStep for hip mobility and ms endurance  L4 10' L4 10' L4 10' L5 10' L5 10'   Ther Activity         Step ups F/L  NV 6\" 10xea 6\" 10x ea 6\" 2x10 6\" 2x10   Side stepping  NV 5x// bars 5x// bars L2 5x// bars L2 5x //bars            Modalities         MH 10' R LB/hip in sitting post exer 10' R LB/hip in sitting post exer 10' R LB/hip in sitting " post exer 10' R LB/hip  In sitting post exer declined

## 2025-06-10 ENCOUNTER — OFFICE VISIT (OUTPATIENT)
Dept: PHYSICAL THERAPY | Facility: CLINIC | Age: 70
End: 2025-06-10
Attending: FAMILY MEDICINE
Payer: MEDICARE

## 2025-06-10 DIAGNOSIS — M54.41 ACUTE BACK PAIN WITH SCIATICA, RIGHT: Primary | ICD-10-CM

## 2025-06-10 PROCEDURE — 97112 NEUROMUSCULAR REEDUCATION: CPT

## 2025-06-10 PROCEDURE — 97530 THERAPEUTIC ACTIVITIES: CPT

## 2025-06-10 PROCEDURE — 97110 THERAPEUTIC EXERCISES: CPT

## 2025-06-10 NOTE — PROGRESS NOTES
"Daily Note     Today's date: 6/10/2025  Patient name: Tashia Duffy  : 1955  MRN: [id number]  Referring provider: Sondra Cuellar DO  Dx:   Encounter Diagnosis     ICD-10-CM    1. Acute back pain with sciatica, right  M54.41                      Subjective: Patient reports that she had a painful weekend in her R hip, she felt it came out of place and happened several times.      Objective: See treatment diary below.       Assessment: Tolerated treatment well. Patient would benefit from continued PT to increase ROM, strength and stability to improve functional limitations to return to prior level and perform functional activities for longer periods of time. She tolerated program today. She has B hip weakness but this is improving. Progress to tolerance.       Plan: Continue per plan of care.      Precautions: Ankylosis spondylitis, B knee surgeries   HEP issued. Diagnosis, prognosis and PT POC discussed with patient     6/10 5/21 5/27 5/29 6/3 6/5   Manuals                                    Neuro Re-Ed         PPT 5\"x20 5\"x20 5\"x20 5\"x20 5\"x20 5\"x20   PPT w/ march 2x10 ea 20x ea 2x10 ea 2x10 ea 2x10 ea 2x10 ea   PPT w/ SLR 2x10 ea    10x ea 2x10 ea   Tandem Stance On AE 30\"x2    On firm 30\"x2 On firm 30\"x2   PB squat 2x10                 Ther Ex         Prone quad stretch-BLE  CM CM Hold      LTR 10\"x10 10\"x10 10\"x10 10\"x10 10\"x10 10\"x10   Piri stretch(fig 4) 20\"x3 ea 20\"x3 ea BLE 20\"x3 ea BLE 20\"x3 ea BLE 20\"x3 ea BLE 20\"x3 ea BLE   Bridge 5\"x20 5\"x10 5\"x10 5\"x10 5\"x20 5\"x20   Clam In SL L2 2x10 In SL 2x10 In SL 2x10 In SL 2x10 ea In SL L2 2x10 In SL L2 2x10   Iso hip add 5\"x20 5\"x20 5\"x20 5\"x20 5\"x20 5\"x20   Standing hip abd,ext 2# 2x10 NV BLE 2x10 BLE 2x10 BLE 3x10 2# 2x10   HS stretch   8\" step 30\"x3 Hold                       NuStep for hip mobility and ms endurance L5 10' L4 10' L4 10' L4 10' L5 10' L5 10'   Ther Activity         Step ups F/L 6\" 2x10 NV 6\" 10xea 6\" 10x ea 6\" 2x10 6\" 2x10   Side " stepping L2 5x// bars NV 5x// bars 5x// bars L2 5x// bars L2 5x //bars            Modalities         MH  10' R LB/hip in sitting post exer 10' R LB/hip in sitting post exer 10' R LB/hip  In sitting post exer declined

## 2025-06-11 NOTE — PROGRESS NOTES
"Daily Note     Today's date: 2025  Patient name: Tashia Duffy  : 1955  MRN: [id number]  Referring provider: Sondra Cuellar DO  Dx:   Encounter Diagnosis     ICD-10-CM    1. Acute back pain with sciatica, right  M54.41                      Subjective: The patient states that she is having some R sided LBP today.        Objective: See treatment diary below      Assessment: Tolerated treatment well.  She reported pulling in her R lower back with supine SLR on R.  Despite being sore with this TE she had no increase in overall pain at end of session.  Patient demonstrated fatigue post treatment and would benefit from continued PT      Plan: Continue per plan of care.      Precautions: Ankylosis spondylitis, B knee surgeries   HEP issued. Diagnosis, prognosis and PT POC discussed with patient     6/10 6/12 5/27 5/29 6/3 6/5   Manuals                                    Neuro Re-Ed         PPT 5\"x20 5\"x20 5\"x20 5\"x20 5\"x20 5\"x20   PPT w/ march 2x10 ea 20x ea 2x10 ea 2x10 ea 2x10 ea 2x10 ea   PPT w/ SLR 2x10 ea 2x10 ea   10x ea 2x10 ea   Tandem Stance On AE 30\"x2 On AE 30\"x2   On firm 30\"x2 On firm 30\"x2   PB squat 2x10 2x10                Ther Ex         Prone quad stretch-BLE   CM Hold      LTR 10\"x10 10\"  10x 10\"x10 10\"x10 10\"x10 10\"x10   Piri stretch(fig 4) 20\"x3 ea 20\"x3 ea BLE 20\"x3 ea BLE 20\"x3 ea BLE 20\"x3 ea BLE 20\"x3 ea BLE   Bridge 5\"x20 5\"x20 5\"x10 5\"x10 5\"x20 5\"x20   Clam In SL L2 2x10 In SL L2 2x10 In SL 2x10 In SL 2x10 ea In SL L2 2x10 In SL L2 2x10   Iso hip add 5\"x20 5\"x20 5\"x20 5\"x20 5\"x20 5\"x20   Standing hip abd,ext 2# 2x10 2# 2x10 BLE 2x10 BLE 2x10 BLE 3x10 2# 2x10   HS stretch   8\" step 30\"x3 Hold                       NuStep for hip mobility and ms endurance L5 10' L5 10' L4 10' L4 10' L5 10' L5 10'   Ther Activity         Step ups F/L 6\" 2x10 6\" 2x10 6\" 10xea 6\" 10x ea 6\" 2x10 6\" 2x10   Side stepping L2 5x// bars L2 5x // bars 5x// bars 5x// bars L2 5x// bars L2 5x //bars         "    Modalities         MH   10' R LB/hip in sitting post exer 10' R LB/hip  In sitting post exer declined

## 2025-06-12 ENCOUNTER — OFFICE VISIT (OUTPATIENT)
Dept: PHYSICAL THERAPY | Facility: CLINIC | Age: 70
End: 2025-06-12
Attending: FAMILY MEDICINE
Payer: MEDICARE

## 2025-06-12 DIAGNOSIS — M54.41 ACUTE BACK PAIN WITH SCIATICA, RIGHT: Primary | ICD-10-CM

## 2025-06-12 PROCEDURE — 97110 THERAPEUTIC EXERCISES: CPT | Performed by: PHYSICAL THERAPIST

## 2025-06-12 PROCEDURE — 97530 THERAPEUTIC ACTIVITIES: CPT | Performed by: PHYSICAL THERAPIST

## 2025-06-12 PROCEDURE — 97112 NEUROMUSCULAR REEDUCATION: CPT | Performed by: PHYSICAL THERAPIST

## 2025-06-17 ENCOUNTER — OFFICE VISIT (OUTPATIENT)
Dept: PHYSICAL THERAPY | Facility: CLINIC | Age: 70
End: 2025-06-17
Attending: FAMILY MEDICINE
Payer: MEDICARE

## 2025-06-17 DIAGNOSIS — M54.41 ACUTE BACK PAIN WITH SCIATICA, RIGHT: Primary | ICD-10-CM

## 2025-06-17 PROCEDURE — 97110 THERAPEUTIC EXERCISES: CPT

## 2025-06-17 PROCEDURE — 97112 NEUROMUSCULAR REEDUCATION: CPT

## 2025-06-17 PROCEDURE — 97530 THERAPEUTIC ACTIVITIES: CPT

## 2025-06-17 NOTE — PROGRESS NOTES
"Daily Note     Today's date: 2025  Patient name: Tashia Duffy  : 1955  MRN: [id number]  Referring provider: Sondra Cuellar DO  Dx:   Encounter Diagnosis     ICD-10-CM    1. Acute back pain with sciatica, right  M54.41                      Subjective: Patient reports that she is not feeling \"too bad\" today. She reports her R LE feels \"heavy.\"      Objective: See treatment diary below.       Assessment: Tolerated treatment well. Patient would benefit from continued PT to increase ROM, strength and stability to improve functional limitations to return to prior level and perform functional activities for longer periods of time. She tolerated program today. She has B hip weakness but this is improving. R hip is weaker compared to L hip. Progress to tolerance.       Plan: Continue per plan of care.      Precautions: Ankylosis spondylitis, B knee surgeries   HEP issued. Diagnosis, prognosis and PT POC discussed with patient     6/10 6/12 6/17 5/29 6/3 6/5   Manuals                                    Neuro Re-Ed         PPT 5\"x20 5\"x20 HEP 5\"x20 5\"x20 5\"x20   PPT w/ march 2x10 ea 20x ea 2x10 2x10 ea 2x10 ea 2x10 ea   PPT w/ R 2x10 ea 2x10 ea 2x10  10x ea 2x10 ea   Tandem Stance On AE 30\"x2 On AE 30\"x2 On AE 30\"x2  On firm 30\"x2 On firm 30\"x2   PB squat 2x10 2x10 2x10               Ther Ex         Prone quad stretch-BLE    Hold      LTR 10\"x10 10\"  10x 10\"x10 10\"x10 10\"x10 10\"x10   Piri stretch(fig 4) 20\"x3 ea 20\"x3 ea BLE BLE 20\"x3 20\"x3 ea BLE 20\"x3 ea BLE 20\"x3 ea BLE   Bridge 5\"x20 5\"x20 5\"x20 5\"x10 5\"x20 5\"x20   Clam In SL L2 2x10 In SL L2 2x10 In SL L2 2x10 In SL 2x10 ea In SL L2 2x10 In SL L2 2x10   Iso hip add 5\"x20 5\"x20 5\"x20 5\"x20 5\"x20 5\"x20   Standing hip abd,ext 2# 2x10 2# 2x10 2# 2x10 BLE 2x10 BLE 3x10 2# 2x10   HS stretch    Hold                       NuStep for hip mobility and ms endurance L5 10' L5 10' L6 10' L4 10' L5 10' L5 10'   Ther Activity         Step ups F/L 6\" 2x10 6\" 2x10 " "8\" 2x10 6\" 10x ea 6\" 2x10 6\" 2x10   Side stepping L2 5x// bars L2 5x // bars L2 5x// bars 5x// bars L2 5x// bars L2 5x //bars            Modalities         MH    10' R LB/hip  In sitting post exer declined                                       "

## 2025-06-19 ENCOUNTER — OFFICE VISIT (OUTPATIENT)
Dept: PHYSICAL THERAPY | Facility: CLINIC | Age: 70
End: 2025-06-19
Attending: FAMILY MEDICINE
Payer: MEDICARE

## 2025-06-19 DIAGNOSIS — M54.41 ACUTE BACK PAIN WITH SCIATICA, RIGHT: Primary | ICD-10-CM

## 2025-06-19 PROCEDURE — 97110 THERAPEUTIC EXERCISES: CPT | Performed by: PHYSICAL THERAPIST

## 2025-06-19 PROCEDURE — 97530 THERAPEUTIC ACTIVITIES: CPT | Performed by: PHYSICAL THERAPIST

## 2025-06-19 PROCEDURE — 97112 NEUROMUSCULAR REEDUCATION: CPT | Performed by: PHYSICAL THERAPIST

## 2025-06-19 NOTE — PROGRESS NOTES
PT Re-Evaluation     Today's date: 2025  Patient name: Tashia Duffy  : 1955  MRN: [id number]  Referring provider: Sondra Cuellar DO  Dx:   Encounter Diagnosis     ICD-10-CM    1. Acute back pain with sciatica, right  M54.41           Start Time: 1515  Stop Time: 1600  Total time in clinic (min): 45 minutes    Assessment  Impairments: abnormal or restricted ROM, impaired physical strength, lacks appropriate home exercise program and pain with function    Assessment details: The patient is responding well to physical therapy treatment with gains noted in B LE strength. She notes an improvement in her back pain but she continues to have difficulty bending forward for a prolonged period of time. She also notes muscle fatigue when ascending stairs. Mild deficits persist in B hip abd and ext strength. She would benefit from continued therapy to address ongoing deficits and to further improve functional status.     Goals  STG(4 weeks):            1. Independent with HEP  MET            2. Decrease pain to 4/10 at worst with functional activities  MET            3. Increase AROM L/S WFL with pain <3-4/10 MET            4. Increase strength by 1/2 MMT grade  MET     LTG(8 weeks): ONGOING             1. Eliminate pain with functional activities and with sleep             2. Increase BLE strength to 5/5             3. Improve R hip flexibility to Good             4. Return to PLOF       Plan  Patient would benefit from: skilled physical therapy  Planned modality interventions: cryotherapy and thermotherapy: hydrocollator packs    Planned therapy interventions: abdominal trunk stabilization, manual therapy, neuromuscular re-education, strengthening, stretching, therapeutic activities, therapeutic exercise and home exercise program    Frequency: 2x week  Duration in weeks: 8  Plan of Care beginning date: 2025  Plan of Care expiration date: 2025  Treatment plan discussed with:  patient        Subjective Evaluation    History of Present Illness  Mechanism of injury: The patient reports intermittent sciatica  ove rthe past 20 years. She experienced a recent flare 2 1/2 weeks ago. She was doing heavy outside work for several days. She experienced pain in the R lumbar area that extended down the lateral R thigh and calf and lateral border of the R foot. She was prescribed prednisone for a 5 day course which has helped with the pain. She was diagnosed with ankylosing spondylitis 20 years ago and sees a rheumatologist. She notes most pain during the night when trying to sleep.     UPDATE 6/19/25:  The patient reports 0/10 pain today. She has difficulty sleeping in bed due to pain across her LB. She can't maintain a bent forward position for very long without pain.   Patient Goals  Patient goal: I'd like to sleep better  Pain  Exacerbated by: turning to the L.    Social Support  Steps to enter house: yes  Stairs in house: yes (has 1st floor bedroom)   Lives in: multiple-level home  Lives with: alone    Employment status: not working        Objective     Concurrent Complaints  Positive for disturbed sleep.     Palpation     Right   Tenderness of the lumbar paraspinals.     Tenderness     Additional Tenderness Details  R SIJ    Neurological Testing     Sensation     Lumbar   Left   Intact: light touch    Right   Intact: light touch    Reflexes   Left   Patellar (L4): normal (2+)    Right   Patellar (L4): normal (2+)    Active Range of Motion     Lumbar   Flexion:  WFL  Extension:  with pain Restriction level: minimal  Left lateral flexion:  Restriction level: moderate  Right lateral flexion:  Restriction level: moderate  Left rotation:  with pain Restriction level: minimal  Right rotation:  Restriction level: minimal    Strength/Myotome Testing     Left Hip   Planes of Motion   Flexion: 4+  Extension: 4  Abduction: 4+    Right Hip   Planes of Motion   Flexion: 4+  Extension: 4  Abduction:  "4+    Left Knee   Flexion: 4+  Extension: 5    Right Knee   Flexion: 4+  Extension: 5    Left Ankle/Foot   Dorsiflexion: 5  Plantar flexion: 4+    Right Ankle/Foot   Dorsiflexion: 5  Plantar flexion: 4+    Tests     Lumbar     Left   Negative passive SLR.     Right   Negative passive SLR.     Left Hip   Negative BANG.     Right Hip   Negative BANG and long sit.     Additional Tests Details  Flexibility  Hamstring Good bilaterally                   Piriformis  R Fair,   L Good                   Quad  R Poor, L Fair    Ambulation     Ambulation: Stairs   Ascend stairs: independent (slowly)  Pattern: reciprocal  Railings: one rail  Descend stairs: independent (slowly)  Pattern: reciprocal  Railings: one rail             Precautions: Ankylosis spondylitis  HEP issued. Diagnosis, prognosis and PT POC discussed with patient       6/10 6/12 6/17 6/19  6/5   Manuals                                    Neuro Re-Ed         PPT 5\"x20 5\"x20 HEP   5\"x20   PPT w/ march 2x10 ea 20x ea 2x10 2x10 ea  2x10 ea   PPT w/ SLR 2x10 ea 2x10 ea 2x10 2x10  2x10 ea   Tandem Stance On AE 30\"x2 On AE 30\"x2 On AE 30\"x2 On AE 30\"x2 Progress to BB walking On firm 30\"x2   PB squat 2x10 2x10 2x10 2x10     Dead bug    NV     Bird dog         Ther Ex         Prone quad stretch-BLE         LTR 10\"x10 10\"  10x 10\"x10 10\"x10  10\"x10   Piri stretch(fig 4) 20\"x3 ea 20\"x3 ea BLE BLE 20\"x3 20\"x3 ea BLE  20\"x3 ea BLE   Bridge 5\"x20 5\"x20 5\"x20 5\"x20  5\"x20   Clam In SL L2 2x10 In SL L2 2x10 In SL L2 2x10 In SL L2 2x10 ea  In SL L2 2x10   Iso hip add 5\"x20 5\"x20 5\"x20 5\"x20  5\"x20   Standing hip abd,ext 2# 2x10 2# 2x10 2# 2x10 2# BLE 2x10  2# 2x10   HS stretch                           NuStep for hip mobility and ms endurance L5 10' L5 10' L6 10' L6 10'  L5 10'   Ther Activity         Step ups F/L 6\" 2x10 6\" 2x10 8\" 2x10 8\" 10x ea  6\" 2x10   Side stepping L2 5x// bars L2 5x // bars L2 5x// bars L2 5x// bars  L2 5x //bars            Modalities         MH       "

## 2025-06-24 ENCOUNTER — OFFICE VISIT (OUTPATIENT)
Dept: PHYSICAL THERAPY | Facility: CLINIC | Age: 70
End: 2025-06-24
Attending: FAMILY MEDICINE
Payer: MEDICARE

## 2025-06-24 DIAGNOSIS — M54.41 ACUTE BACK PAIN WITH SCIATICA, RIGHT: Primary | ICD-10-CM

## 2025-06-24 PROCEDURE — 97112 NEUROMUSCULAR REEDUCATION: CPT

## 2025-06-24 PROCEDURE — 97530 THERAPEUTIC ACTIVITIES: CPT

## 2025-06-24 PROCEDURE — 97110 THERAPEUTIC EXERCISES: CPT

## 2025-06-24 NOTE — PROGRESS NOTES
"Daily Note     Today's date: 2025  Patient name: Tashia Duffy  : 1955  MRN: [id number]  Referring provider: Sondra Cuellar DO  Dx:   Encounter Diagnosis     ICD-10-CM    1. Acute back pain with sciatica, right  M54.41                      Subjective: Patient reports that she feels her strength is improving.       Objective: See treatment diary below. Progressed core and strengthening.      Assessment: Tolerated treatment well. Patient would benefit from continued PT to increase ROM, strength and stability to improve functional limitations to return to prior level and perform functional activities for longer periods of time. She tolerated progression today, core still has some strength limitations. Progress to tolerance.       Plan: Continue per plan of care.      Precautions: Ankylosis spondylitis  HEP issued. Diagnosis, prognosis and PT POC discussed with patient       6/10 6/12 6/17 6/19 6/24 6/5   Manuals                                    Neuro Re-Ed         PPT 5\"x20 5\"x20 HEP   5\"x20   PPT w/ march 2x10 ea 20x ea 2x10 2x10 ea With bridge 2x10 2x10 ea   PPT w/ SLR 2x10 ea 2x10 ea 2x10 2x10  2x10 ea   Tandem Stance On AE 30\"x2 On AE 30\"x2 On AE 30\"x2 On AE 30\"x2 On BB ambulation 5x//  On firm 30\"x2   SLS      On firm 30\"x2    PB squat 2x10 2x10 2x10 2x10 2x10    Dead bug    NV 2x5    Supermans     10x ea    Ther Ex         Prone quad stretch-BLE         LTR 10\"x10 10\"  10x 10\"x10 10\"x10 10\"x10 10\"x10   Piri stretch(fig 4) 20\"x3 ea 20\"x3 ea BLE BLE 20\"x3 20\"x3 ea BLE 20\"x3 ea BLE 20\"x3 ea BLE   Bridge 5\"x20 5\"x20 5\"x20 5\"x20 W/ ball adduction 5\"x20 5\"x20   Clam In SL L2 2x10 In SL L2 2x10 In SL L2 2x10 In SL L2 2x10 ea In SL L2 2x10 ea In SL L2 2x10   Iso hip add 5\"x20 5\"x20 5\"x20 5\"x20 DC 5\"x20   Standing hip abd,ext 2# 2x10 2# 2x10 2# 2x10 2# BLE 2x10 BLE 3# 2x10 2# 2x10   HS machine         Leg ext                  NuStep for hip mobility and ms endurance L5 10' L5 10' L6 10' L6 10' L6 " "10' L5 10'   Ther Activity         Step ups F/L 6\" 2x10 6\" 2x10 8\" 2x10 8\" 10x ea 8\" 2x10  6\" 2x10   Side stepping L2 5x// bars L2 5x // bars L2 5x// bars L2 5x// bars L3 5x// bars L2 5x //bars            Modalities         MH                             "

## 2025-06-26 ENCOUNTER — APPOINTMENT (OUTPATIENT)
Dept: PHYSICAL THERAPY | Facility: CLINIC | Age: 70
End: 2025-06-26
Attending: FAMILY MEDICINE
Payer: MEDICARE

## 2025-06-30 ENCOUNTER — OFFICE VISIT (OUTPATIENT)
Dept: PHYSICAL THERAPY | Facility: CLINIC | Age: 70
End: 2025-06-30
Attending: FAMILY MEDICINE
Payer: MEDICARE

## 2025-06-30 DIAGNOSIS — M54.41 ACUTE BACK PAIN WITH SCIATICA, RIGHT: Primary | ICD-10-CM

## 2025-06-30 PROCEDURE — 97530 THERAPEUTIC ACTIVITIES: CPT | Performed by: PHYSICAL THERAPIST

## 2025-06-30 PROCEDURE — 97112 NEUROMUSCULAR REEDUCATION: CPT | Performed by: PHYSICAL THERAPIST

## 2025-06-30 PROCEDURE — 97110 THERAPEUTIC EXERCISES: CPT | Performed by: PHYSICAL THERAPIST

## 2025-06-30 NOTE — PROGRESS NOTES
"Daily Note     Today's date: 2025  Patient name: Tashia Duffy  : 1955  MRN: [id number]  Referring provider: Sondra Cuellar DO  Dx:   Encounter Diagnosis     ICD-10-CM    1. Acute back pain with sciatica, right  M54.41           Start Time: 1515  Stop Time: 1600  Total time in clinic (min): 45 minutes    Subjective: The patient reports mild muscle soreness since progressing exercises.      Objective: See treatment diary below      Assessment: Occasional verbal cues needed for correct exercise technique and for pacing to improve eccentric control. Mild muscle soreness reported with resisted clam exercise. Piriformis stretching improves soreness. Tolerated treatment well. Patient would benefit from continued PT      Plan: Continue per plan of care.      Precautions: Ankylosis spondylitis  HEP issued. Diagnosis, prognosis and PT POC discussed with patient       6/10 6/12 6/17 6/19 6/24 6/30   Manuals                                    Neuro Re-Ed         PPT 5\"x20 5\"x20 HEP      PPT w/ march 2x10 ea 20x ea 2x10 2x10 ea With bridge 2x10 With bridge 20x   PPT w/ SLR 2x10 ea 2x10 ea 2x10 2x10     Tandem Stance On AE 30\"x2 On AE 30\"x2 On AE 30\"x2 On AE 30\"x2 On BB ambulation 5x//  On BB ambulation 5x //bars   SLS      On firm 30\"x2 On firm 30\"x2 ea   PB squat 2x10 2x10 2x10 2x10 2x10 2x10   Dead bug    NV 2x5 4x5   Supermans     10x ea 2x10   Ther Ex         Prone quad stretch-BLE         LTR 10\"x10 10\"  10x 10\"x10 10\"x10 10\"x10 HEP   Piri stretch(fig 4) 20\"x3 ea 20\"x3 ea BLE BLE 20\"x3 20\"x3 ea BLE 20\"x3 ea BLE 20\"x3 ea BLE   Bridge 5\"x20 5\"x20 5\"x20 5\"x20 W/ ball adduction 5\"x20 5\"x20 w/ ball add   Clam In SL L2 2x10 In SL L2 2x10 In SL L2 2x10 In SL L2 2x10 ea In SL L2 2x10 ea In SL L2 20x ea   Iso hip add 5\"x20 5\"x20 5\"x20 5\"x20 DC    Standing hip abd,ext 2# 2x10 2# 2x10 2# 2x10 2# BLE 2x10 BLE 3# 2x10 3# 2x10   HS machine S4      P3 2x10   Leg ext  S1      P1 2x10            NuStep for hip mobility " "and ms endurance L5 10' L5 10' L6 10' L6 10' L6 10' L6 10'   Ther Activity         Step ups F/L 6\" 2x10 6\" 2x10 8\" 2x10 8\" 10x ea 8\" 2x10  8\" 2x10   Side stepping L2 5x// bars L2 5x // bars L2 5x// bars L2 5x// bars L3 5x// bars L3 5x //bars            Modalities         MH                               "

## 2025-07-01 PROBLEM — E53.8 VITAMIN B 12 DEFICIENCY: Status: ACTIVE | Noted: 2018-10-11

## 2025-07-01 PROBLEM — M45.6 ANKYLOSING SPONDYLITIS OF LUMBAR REGION (HCC): Status: ACTIVE | Noted: 2024-05-14

## 2025-07-01 PROBLEM — M35.3 POLYMYALGIA RHEUMATICA (HCC): Status: ACTIVE | Noted: 2023-05-04

## 2025-07-01 PROBLEM — D84.821 IMMUNOSUPPRESSION DUE TO CHRONIC STEROID USE (HCC): Status: ACTIVE | Noted: 2022-11-04

## 2025-07-01 PROBLEM — Z79.52 IMMUNOSUPPRESSION DUE TO CHRONIC STEROID USE (HCC): Status: ACTIVE | Noted: 2022-11-04

## 2025-07-01 PROBLEM — T38.0X5A IMMUNOSUPPRESSION DUE TO CHRONIC STEROID USE (HCC): Status: ACTIVE | Noted: 2022-11-04

## 2025-07-01 PROBLEM — D31.32 NEVUS OF CHOROID OF LEFT EYE: Status: ACTIVE | Noted: 2020-08-20

## 2025-07-01 PROBLEM — M85.80 OSTEOPENIA: Status: ACTIVE | Noted: 2018-10-11

## 2025-07-03 ENCOUNTER — OFFICE VISIT (OUTPATIENT)
Dept: PHYSICAL THERAPY | Facility: CLINIC | Age: 70
End: 2025-07-03
Attending: FAMILY MEDICINE
Payer: MEDICARE

## 2025-07-03 DIAGNOSIS — M54.41 ACUTE BACK PAIN WITH SCIATICA, RIGHT: Primary | ICD-10-CM

## 2025-07-03 PROCEDURE — 97112 NEUROMUSCULAR REEDUCATION: CPT | Performed by: PHYSICAL THERAPIST

## 2025-07-03 PROCEDURE — 97530 THERAPEUTIC ACTIVITIES: CPT | Performed by: PHYSICAL THERAPIST

## 2025-07-03 PROCEDURE — 97110 THERAPEUTIC EXERCISES: CPT | Performed by: PHYSICAL THERAPIST

## 2025-07-03 NOTE — PROGRESS NOTES
"Daily Note     Today's date: 7/3/2025  Patient name: Tashia Duffy  : 1955  MRN: [id number]  Referring provider: Sondra Cuellar DO  Dx:   Encounter Diagnosis     ICD-10-CM    1. Acute back pain with sciatica, right  M54.41           Start Time: 1300  Stop Time: 1345  Total time in clinic (min): 45 minutes    Subjective: The patient reports soreness in her abdominals. Her gastro doctor recommended to stop prone exercise.      Objective: See treatment diary below      Assessment: Good tolerance to all exercises. Core stability appears to be improving. Prone superman exercise discontinued as per physician recommendation. Tolerated treatment well. Patient would benefit from continued PT      Plan: Continue per plan of care.      Precautions: Ankylosis spondylitis  HEP issued. Diagnosis, prognosis and PT POC discussed with patient       7/3 6/12 6/17 6/19 6/24 6/30   Manuals                                    Neuro Re-Ed         PPT  5\"x20 HEP      PPT w/ march With bridge 2x10 ea 20x ea 2x10 2x10 ea With bridge 2x10 With bridge 20x   PPT w/ SLR 2x10 ea 2x10 ea 2x10 2x10     Tandem Stance On BB ambulation 5x //bars On AE 30\"x2 On AE 30\"x2 On AE 30\"x2 On BB ambulation 5x//  On BB ambulation 5x //bars   SLS  On firm 30\"x2 ea    On firm 30\"x2 On firm 30\"x2 ea   PB squat 2x10 2x10 2x10 2x10 2x10 2x10   Dead bug 2x10   NV 2x5 4x5   Supermans D/C    10x ea 2x10   Ther Ex         Prone quad stretch-BLE         LTR  10\"  10x 10\"x10 10\"x10 10\"x10 HEP   Piri stretch(fig 4) 20\"x3 ea 20\"x3 ea BLE BLE 20\"x3 20\"x3 ea BLE 20\"x3 ea BLE 20\"x3 ea BLE   Bridge 5\"x20 5\"x20 5\"x20 5\"x20 W/ ball adduction 5\"x20 5\"x20 w/ ball add   Clam In SL L2 2x10 In SL L2 2x10 In SL L2 2x10 In SL L2 2x10 ea In SL L2 2x10 ea In SL L2 20x ea   Iso hip add  5\"x20 5\"x20 5\"x20 DC    Standing hip abd,ext 3# 2x10 2# 2x10 2# 2x10 2# BLE 2x10 BLE 3# 2x10 3# 2x10   HS machine S4 P3 2x10     P3 2x10   Leg ext  S1 P1 2x10     P1 2x10            NuStep " "for hip mobility and ms endurance L6 10' L5 10' L6 10' L6 10' L6 10' L6 10'   Ther Activity         Step ups F/L 8\" 2x10 6\" 2x10 8\" 2x10 8\" 10x ea 8\" 2x10  8\" 2x10   Side stepping L3 5x// bars L2 5x // bars L2 5x// bars L2 5x// bars L3 5x// bars L3 5x //bars            Modalities         MH                                 "

## 2025-07-08 ENCOUNTER — OFFICE VISIT (OUTPATIENT)
Dept: PHYSICAL THERAPY | Facility: CLINIC | Age: 70
End: 2025-07-08
Attending: FAMILY MEDICINE
Payer: MEDICARE

## 2025-07-08 DIAGNOSIS — M54.41 ACUTE BACK PAIN WITH SCIATICA, RIGHT: Primary | ICD-10-CM

## 2025-07-08 PROCEDURE — 97110 THERAPEUTIC EXERCISES: CPT

## 2025-07-08 PROCEDURE — 97530 THERAPEUTIC ACTIVITIES: CPT

## 2025-07-08 PROCEDURE — 97112 NEUROMUSCULAR REEDUCATION: CPT

## 2025-07-08 NOTE — PROGRESS NOTES
"Daily Note     Today's date: 2025  Patient name: Tashia Duffy  : 1955  MRN: [id number]  Referring provider: Sondra Cuellar DO  Dx:   Encounter Diagnosis     ICD-10-CM    1. Acute back pain with sciatica, right  M54.41                      Subjective: Patient reports that her L dorsal foot has pain today. 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       Objective: See treatment diary below.      Assessment: Tolerated treatment well. Patient would benefit from continued PT to increase ROM, strength and stability to improve functional limitations to return to prior level and perform functional activities for longer periods of time. No major concerns during session today, foot pain during step ups. Progress to tolerance       Plan: Continue per plan of care.      Precautions: Ankylosis spondylitis  HEP issued. Diagnosis, prognosis and PT POC discussed with patient       7/3 7/8 6/17 6/19 6/24 6/30   Manuals                                    Neuro Re-Ed         PPT   HEP      PPT w/ march With bridge 2x10 ea With bridge 2x10 2x10 2x10 ea With bridge 2x10 With bridge 20x   PPT w/ SLR 2x10 ea 2x10 ea 2x10 2x10     Tandem Stance On BB ambulation 5x //bars On BB ambulation 5x //bars On AE 30\"x2 On AE 30\"x2 On BB ambulation 5x//  On BB ambulation 5x //bars   SLS  On firm 30\"x2 ea On firm 30\"x2 ea   On firm 30\"x2 On firm 30\"x2 ea   PB squat 2x10 2x10 2x10 2x10 2x10 2x10   Dead bug 2x10 2x10  NV 2x5 4x5   Supermans D/C    10x ea 2x10   Ther Ex         Prone quad stretch-BLE         LTR   10\"x10 10\"x10 10\"x10 HEP   Piri stretch(fig 4) 20\"x3 ea 20\"x3 ea BLE 20\"x3 20\"x3 ea BLE 20\"x3 ea BLE 20\"x3 ea BLE   Bridge 5\"x20 5\"x20 w/ ball add 5\"x20 5\"x20 W/ ball adduction 5\"x20 5\"x20 w/ ball add   Clam In SL L2 2x10 In SL L2 2x10 In SL L2 2x10 In SL L2 2x10 ea In SL L2 2x10 ea In SL L2 20x ea   Iso hip add   5\"x20 5\"x20 DC    Standing hip abd,ext 3# 2x10 3# 2x10 2# 2x10 2# BLE 2x10 BLE 3# 2x10 3# 2x10   HS machine " "S4 P3 2x10 P3 2x10    P3 2x10   Leg ext  S1 P1 2x10 P1 2x10    P1 2x10            NuStep for hip mobility and ms endurance L6 10' L6 10' L6 10' L6 10' L6 10' L6 10'   Ther Activity         Step ups F/L 8\" 2x10 8\" 2x10 8\" 2x10 8\" 10x ea 8\" 2x10  8\" 2x10   Side stepping L3 5x// bars L3 5x//  bars L2 5x// bars L2 5x// bars L3 5x// bars L3 5x //bars            Modalities         MH                                   "

## 2025-07-10 ENCOUNTER — OFFICE VISIT (OUTPATIENT)
Dept: PHYSICAL THERAPY | Facility: CLINIC | Age: 70
End: 2025-07-10
Attending: FAMILY MEDICINE
Payer: MEDICARE

## 2025-07-10 DIAGNOSIS — M54.41 ACUTE BACK PAIN WITH SCIATICA, RIGHT: Primary | ICD-10-CM

## 2025-07-10 PROCEDURE — 97530 THERAPEUTIC ACTIVITIES: CPT | Performed by: PHYSICAL THERAPIST

## 2025-07-10 PROCEDURE — 97110 THERAPEUTIC EXERCISES: CPT | Performed by: PHYSICAL THERAPIST

## 2025-07-10 PROCEDURE — 97112 NEUROMUSCULAR REEDUCATION: CPT | Performed by: PHYSICAL THERAPIST

## 2025-07-10 NOTE — PROGRESS NOTES
PT Discharge    Today's date: 7/10/2025  Patient name: Tashia Duffy  : 1955  MRN: [id number]  Referring provider: Sondra Cuellar DO  Dx:   Encounter Diagnosis     ICD-10-CM    1. Acute back pain with sciatica, right  M54.41           Start Time: 1345  Stop Time: 1430  Total time in clinic (min): 45 minutes    Assessment    Assessment details: The patient has responded well to physical therapy treatment. She is independent with her HEP and plans to continue with an independent exercise routine. She will be discharged from PT services at this time.     Goals  STG(4 weeks):            1. Independent with HEP  MET            2. Decrease pain to 4/10 at worst with functional activities  MET            3. Increase AROM L/S WFL with pain <3-4/10 MET            4. Increase strength by 1/2 MMT grade  MET     LTG(8 weeks):              1. Eliminate pain with functional activities and with sleep  MET             2. Increase BLE strength to 5/5 PARTIALLY MET             3. Improve R hip flexibility to Good  MET             4. Return to PLOF  MET     Plan    Treatment plan discussed with: patient  Plan details: D/C PT and transition to HEP        Subjective Evaluation    History of Present Illness  Mechanism of injury: The patient reports intermittent sciatica  ove rthe past 20 years. She experienced a recent flare 2 1/2 weeks ago. She was doing heavy outside work for several days. She experienced pain in the R lumbar area that extended down the lateral R thigh and calf and lateral border of the R foot. She was prescribed prednisone for a 5 day course which has helped with the pain. She was diagnosed with ankylosing spondylitis 20 years ago and sees a rheumatologist. She notes most pain during the night when trying to sleep.     UPDATE 25:  The patient reports 0/10 pain today. She has difficulty sleeping in bed due to pain across her LB. She can't maintain a bent forward position for very long without pain.    Patient Goals  Patient goal: I'd like to sleep better  Pain  Exacerbated by: turning to the L.    Social Support  Steps to enter house: yes  Stairs in house: yes (has 1st floor bedroom)   Lives in: multiple-level home  Lives with: alone    Employment status: not working        Objective     Concurrent Complaints  Positive for disturbed sleep.     Tenderness     Additional Tenderness Details  R SIJ    Neurological Testing     Sensation     Lumbar   Left   Intact: light touch    Right   Intact: light touch    Reflexes   Left   Patellar (L4): normal (2+)    Right   Patellar (L4): normal (2+)    Active Range of Motion     Lumbar   Flexion:  WFL  Extension:  with pain Restriction level: minimal  Left lateral flexion:  Restriction level: moderate  Right lateral flexion:  Restriction level: moderate  Left rotation:  with pain Restriction level: minimal  Right rotation:  Restriction level: minimal    Strength/Myotome Testing     Left Hip   Planes of Motion   Flexion: 5  Extension: 4+  Abduction: 4+    Right Hip   Planes of Motion   Flexion: 5  Extension: 4+  Abduction: 4+    Left Knee   Flexion: 4+  Extension: 5    Right Knee   Flexion: 4+  Extension: 5    Left Ankle/Foot   Dorsiflexion: 5  Plantar flexion: 4+    Right Ankle/Foot   Dorsiflexion: 5  Plantar flexion: 4+    Tests     Lumbar     Left   Negative passive SLR.     Right   Negative passive SLR.     Left Hip   Negative BANG.     Right Hip   Negative BANG and long sit.     Additional Tests Details  Flexibility  Hamstring Good bilaterally                   Piriformis  R Fair,   L Good                   Quad  R Poor, L Fair    Ambulation     Ambulation: Stairs   Ascend stairs: independent (slowly)  Pattern: reciprocal  Railings: one rail  Descend stairs: independent (slowly)  Pattern: reciprocal  Railings: one rail             Precautions: Ankylosis spondylitis  HEP issued. Diagnosis, prognosis and PT POC discussed with patient

## 2025-07-10 NOTE — PROGRESS NOTES
"Daily Note     Today's date: 7/10/2025  Patient name: Tashia Duffy  : 1955  MRN: [id number]  Referring provider: Sondra Cuellar DO  Dx:   Encounter Diagnosis     ICD-10-CM    1. Acute back pain with sciatica, right  M54.41           Start Time: 1345  Stop Time: 1430  Total time in clinic (min): 45 minutes    Subjective: The patient reports no significant pain over the past few days.      Objective: See treatment diary below      Assessment: All exercises are performed with good technique. She feel comfortable continuing with a home program at this time to manage her occasional symptoms. Tolerated treatment well.      Plan: Transition to home program and D/C formal PT after today's session.     Precautions: Ankylosis spondylitis  HEP issued. Diagnosis, prognosis and PT POC discussed with patient       7/3 7/8 7/10 6/19 6/24 6/30   Manuals                                    Neuro Re-Ed         PPT         PPT w/ march With bridge 2x10 ea With bridge 2x10 With bridge 2x10 2x10 ea With bridge 2x10 With bridge 20x   PPT w/ SLR 2x10 ea 2x10 ea 2x10 ea 2x10     Tandem Stance On BB ambulation 5x //bars On BB ambulation 5x //bars On BB ambulation 5x //bars On AE 30\"x2 On BB ambulation 5x//  On BB ambulation 5x //bars   SLS  On firm 30\"x2 ea On firm 30\"x2 ea On firm 30\"x2  On firm 30\"x2 On firm 30\"x2 ea   PB squat 2x10 2x10 2x10 2x10 2x10 2x10   Dead bug 2x10 2x10 2x10 NV 2x5 4x5   Supermans D/C    10x ea 2x10   Ther Ex         Prone quad stretch-BLE         LTR    10\"x10 10\"x10 HEP   Piri stretch(fig 4) 20\"x3 ea 20\"x3 ea 20\"x3 ea 20\"x3 ea BLE 20\"x3 ea BLE 20\"x3 ea BLE   Bridge 5\"x20 5\"x20 w/ ball add 5\"x20 w/ ball add 5\"x20 W/ ball adduction 5\"x20 5\"x20 w/ ball add   Clam In SL L2 2x10 In SL L2 2x10 In SL L2 2x10 In SL L2 2x10 ea In SL L2 2x10 ea In SL L2 20x ea   Iso hip add    5\"x20 DC    Standing hip abd,ext 3# 2x10 3# 2x10 3# 2x10 ea 2# BLE 2x10 BLE 3# 2x10 3# 2x10   HS machine S4 P3 2x10 P3 2x10 P3 2x10 " "  P3 2x10   Leg ext  S1 P1 2x10 P1 2x10 P1 2x10   P1 2x10            NuStep for hip mobility and ms endurance L6 10' L6 10' L6 10' L6 10' L6 10' L6 10'   Ther Activity         Step ups F/L 8\" 2x10 8\" 2x10 8\" 2x10 8\" 10x ea 8\" 2x10  8\" 2x10   Side stepping L3 5x// bars L3 5x//  bars L3 5x //bars L2 5x// bars L3 5x// bars L3 5x //bars            Modalities         MH                                     "